# Patient Record
Sex: FEMALE | Race: WHITE | NOT HISPANIC OR LATINO | Employment: FULL TIME
[De-identification: names, ages, dates, MRNs, and addresses within clinical notes are randomized per-mention and may not be internally consistent; named-entity substitution may affect disease eponyms.]

---

## 2017-01-26 ENCOUNTER — RECORDS - HEALTHEAST (OUTPATIENT)
Dept: ADMINISTRATIVE | Facility: OTHER | Age: 22
End: 2017-01-26

## 2017-04-20 ENCOUNTER — RECORDS - HEALTHEAST (OUTPATIENT)
Dept: ADMINISTRATIVE | Facility: OTHER | Age: 22
End: 2017-04-20

## 2017-06-30 ENCOUNTER — RECORDS - HEALTHEAST (OUTPATIENT)
Dept: ADMINISTRATIVE | Facility: OTHER | Age: 22
End: 2017-06-30

## 2017-07-06 ENCOUNTER — RECORDS - HEALTHEAST (OUTPATIENT)
Dept: ADMINISTRATIVE | Facility: OTHER | Age: 22
End: 2017-07-06

## 2017-07-14 ENCOUNTER — RECORDS - HEALTHEAST (OUTPATIENT)
Dept: ADMINISTRATIVE | Facility: OTHER | Age: 22
End: 2017-07-14

## 2017-07-21 ENCOUNTER — RECORDS - HEALTHEAST (OUTPATIENT)
Dept: ADMINISTRATIVE | Facility: OTHER | Age: 22
End: 2017-07-21

## 2017-07-22 ENCOUNTER — ANESTHESIA - HEALTHEAST (OUTPATIENT)
Dept: OBGYN | Facility: HOSPITAL | Age: 22
End: 2017-07-22

## 2017-07-26 ENCOUNTER — COMMUNICATION - HEALTHEAST (OUTPATIENT)
Dept: OBGYN | Facility: HOSPITAL | Age: 22
End: 2017-07-26

## 2018-07-01 ENCOUNTER — OFFICE VISIT (OUTPATIENT)
Dept: URGENT CARE | Facility: URGENT CARE | Age: 23
End: 2018-07-01
Payer: COMMERCIAL

## 2018-07-01 VITALS
WEIGHT: 128.4 LBS | RESPIRATION RATE: 16 BRPM | OXYGEN SATURATION: 100 % | DIASTOLIC BLOOD PRESSURE: 84 MMHG | HEART RATE: 73 BPM | TEMPERATURE: 96.8 F | SYSTOLIC BLOOD PRESSURE: 116 MMHG | BODY MASS INDEX: 22.04 KG/M2

## 2018-07-01 DIAGNOSIS — N30.01 ACUTE CYSTITIS WITH HEMATURIA: Primary | ICD-10-CM

## 2018-07-01 DIAGNOSIS — R30.0 DYSURIA: ICD-10-CM

## 2018-07-01 DIAGNOSIS — R82.90 NONSPECIFIC FINDING ON EXAMINATION OF URINE: ICD-10-CM

## 2018-07-01 LAB
ALBUMIN UR-MCNC: NEGATIVE MG/DL
APPEARANCE UR: ABNORMAL
BACTERIA #/AREA URNS HPF: ABNORMAL /HPF
BILIRUB UR QL STRIP: NEGATIVE
COLOR UR AUTO: YELLOW
GLUCOSE UR STRIP-MCNC: NEGATIVE MG/DL
HGB UR QL STRIP: ABNORMAL
KETONES UR STRIP-MCNC: NEGATIVE MG/DL
LEUKOCYTE ESTERASE UR QL STRIP: ABNORMAL
NITRATE UR QL: NEGATIVE
NON-SQ EPI CELLS #/AREA URNS LPF: ABNORMAL /LPF
PH UR STRIP: 7 PH (ref 5–7)
RBC #/AREA URNS AUTO: ABNORMAL /HPF
SOURCE: ABNORMAL
SP GR UR STRIP: <=1.005 (ref 1–1.03)
UROBILINOGEN UR STRIP-ACNC: 0.2 EU/DL (ref 0.2–1)
WBC #/AREA URNS AUTO: ABNORMAL /HPF

## 2018-07-01 PROCEDURE — 87086 URINE CULTURE/COLONY COUNT: CPT | Performed by: FAMILY MEDICINE

## 2018-07-01 PROCEDURE — 99213 OFFICE O/P EST LOW 20 MIN: CPT | Performed by: FAMILY MEDICINE

## 2018-07-01 PROCEDURE — 81001 URINALYSIS AUTO W/SCOPE: CPT | Performed by: FAMILY MEDICINE

## 2018-07-01 PROCEDURE — 87186 SC STD MICRODIL/AGAR DIL: CPT | Performed by: FAMILY MEDICINE

## 2018-07-01 PROCEDURE — 87088 URINE BACTERIA CULTURE: CPT | Performed by: FAMILY MEDICINE

## 2018-07-01 RX ORDER — SULFAMETHOXAZOLE/TRIMETHOPRIM 800-160 MG
1 TABLET ORAL 2 TIMES DAILY
Qty: 6 TABLET | Refills: 0 | Status: SHIPPED | OUTPATIENT
Start: 2018-07-01 | End: 2018-07-04

## 2018-07-01 NOTE — PATIENT INSTRUCTIONS
"   * BLADDER INFECTION,Female (Adult)    A bladder infection (\"cystitis\" or \"UTI\") usually causes a constant urge to urinate and a burning when passing urine. Urine may be cloudy, smelly or dark. There may be pain in the lower abdomen. A bladder infection occurs when bacteria from the vaginal area enter the bladder opening (urethra). This can occur from sexual intercourse, wearing tight clothing, dehydration and other factors.  HOME CARE:  1. Drink lots of fluids (at least 6-8 glasses a day, unless you must restrict fluids for other medical reasons). This will force the medicine into your urinary system and flush the bacteria out of your body. Cranberry juice has been shown to help clear out the bacteria.  2. Avoid sexual intercourse until your symptoms are gone.  3. A bladder infection is treated with antibiotics. You may also be given Pyridium (generic = phenazopyridine) to reduce the burning sensation. This medicine will cause your urine to become a bright orange color. The orange urine may stain clothing. You may wear a pad or panty-liner to protect clothing.  PREVENTING FUTURE INFECTIONS:  1. Always wipe from front to back after a bowel movement.  2. Keep the genital area clean and dry.  3. Drink plenty of fluids each day to avoid dehydration.  4. Urinate right after intercourse to flush out the bladder.  5. Wear cotton underwear and cotton-lined panty hose; avoid tight-fitting pants.  6. If you are on birth control pills and are having frequent bladder infections, discuss with your doctor.  FOLLOW UP: Return to this facility or see your doctor if ALL symptoms are not gone after three days of treatment.  GET PROMPT MEDICAL ATTENTION if any of the following occur:    Fever over 101 F (38.3 C)    No improvement by the third day of treatment    Increasing back or abdominal pain    Repeated vomiting; unable to keep medicine down    Weakness, dizziness or fainting    Vaginal discharge    Pain, redness or swelling in " the labia (outer vaginal area)    5675-4292 [employer]. [address] [place] [address], [place] [address]. All rights reserved. This information is not intended as a substitute for professional medical care. Always follow your healthcare professional's instructions.  This information has been modified by your health care provider with permission from the publisher.

## 2018-07-01 NOTE — MR AVS SNAPSHOT
"              After Visit Summary   7/1/2018    Cindy Castillo    MRN: [id number]           Patient Information     Date Of Birth          1995        Visit Information        Provider Department      7/1/2018 9:05 AM Rosendo Bautista MD [facility]        Today's Diagnoses     Acute cystitis with hematuria    -  1    Dysuria        Nonspecific finding on examination of urine          Care Instructions       * BLADDER INFECTION,Female (Adult)    A bladder infection (\"cystitis\" or \"UTI\") usually causes a constant urge to urinate and a burning when passing urine. Urine may be cloudy, smelly or dark. There may be pain in the lower abdomen. A bladder infection occurs when bacteria from the vaginal area enter the bladder opening (urethra). This can occur from sexual intercourse, wearing tight clothing, dehydration and other factors.  HOME CARE:  1. Drink lots of fluids (at least 6-8 glasses a day, unless you must restrict fluids for other medical reasons). This will force the medicine into your urinary system and flush the bacteria out of your body. Cranberry juice has been shown to help clear out the bacteria.  2. Avoid sexual intercourse until your symptoms are gone.  3. A bladder infection is treated with antibiotics. You may also be given Pyridium (generic = phenazopyridine) to reduce the burning sensation. This medicine will cause your urine to become a bright orange color. The orange urine may stain clothing. You may wear a pad or panty-liner to protect clothing.  PREVENTING FUTURE INFECTIONS:  1. Always wipe from front to back after a bowel movement.  2. Keep the genital area clean and dry.  3. Drink plenty of fluids each day to avoid dehydration.  4. Urinate right after intercourse to flush out the bladder.  5. Wear cotton underwear and cotton-lined panty hose; avoid tight-fitting pants.  6. If you are on birth control pills and are having frequent bladder infections, discuss " with your doctor.  FOLLOW UP: Return to this facility or see your doctor if ALL symptoms are not gone after three days of treatment.  GET PROMPT MEDICAL ATTENTION if any of the following occur:    Fever over 101 F (38.3 C)    No improvement by the third day of treatment    Increasing back or abdominal pain    Repeated vomiting; unable to keep medicine down    Weakness, dizziness or fainting    Vaginal discharge    Pain, redness or swelling in the labia (outer vaginal area)    5542-9605 [employer]. [address] [place] [address]. All rights reserved. This information is not intended as a substitute for professional medical care. Always follow your healthcare professional's instructions.  This information has been modified by your health care provider with permission from the publisher.            Follow-ups after your visit        Who to contact     If you have questions or need follow up information about today's clinic visit or your schedule please contact [facility] directly at [phone].  Normal or non-critical lab and imaging results will be communicated to you by MyChart, letter or phone within 4 business days after the clinic has received the results. If you do not hear from us within 7 days, please contact the clinic through [employer] or phone. If you have a critical or abnormal lab result, we will notify you by phone as soon as possible.  Submit refill requests through [employer] or call your pharmacy and they will forward the refill request to us. Please allow 3 business days for your refill to be completed.          Additional Information About Your Visit        Care EveryWhere ID     This is your Care EveryWhere ID. This could be used by other organizations to access your [facility] medical records  [id number]        Your Vitals Were     Pulse Temperature Respirations Last Period Pulse Oximetry BMI (Body Mass Index)    73 96.8  F (36  C) (Tympanic)  16 06/15/2018 (Approximate) 100% 22.04 kg/m2       Blood Pressure from Last 3 Encounters:   07/01/18 116/84   07/02/15 145/86    Weight from Last 3 Encounters:   07/01/18 128 lb 6.4 oz (58.2 kg)   07/02/15 136 lb (61.7 kg)              We Performed the Following     *UA reflex to Microscopic and Culture ([place] and [facility] (except [place] and [place])     Urine Culture Aerobic Bacterial     Urine Microscopic          Today's Medication Changes          These changes are accurate as of 7/1/18  9:24 AM.  If you have any questions, ask your nurse or doctor.               Start taking these medicines.        Dose/Directions    sulfamethoxazole-trimethoprim 800-160 MG per tablet   Commonly known as:  BACTRIM DS/SEPTRA DS   Used for:  Acute cystitis with hematuria   Started by:  Rosendo Bautista MD        Dose:  1 tablet   Take 1 tablet by mouth 2 times daily for 3 days   Quantity:  6 tablet   Refills:  0            Where to get your medicines      These medications were sent to [id number] - [facility] [address] [facility]  [address] [facility] [address]    Hours:  Closed 10-16-08 business to [facility] Phone:  [phone]     sulfamethoxazole-trimethoprim 800-160 MG per tablet                Primary Care Provider Office Phone # Fax #    [facility] [phone] [address] [facility] [address]        Equal Access to Services     KO ASHRAF AH: Hadii heladio carrasquillo hadasho Soamandeepali, waaxda luqadaha, qaybta kaalmada adeegyada, yandy ngo. So [facility] [phone].    ATENCIÓN: Si habla español, tiene a verdin disposición servicios gratuitos de asistencia lingüística. Llame al [phone].    We comply with applicable federal civil rights laws and [place] laws. We do not discriminate on the basis of race, color, national origin, age, disability, sex, sexual orientation, or gender identity.            Thank you!     Thank you  for choosing [facility]  for your care. Our goal is always to provide you with excellent care. Hearing back from our patients is one way we can continue to improve our services. Please take a few minutes to complete the written survey that you may receive in the mail after your visit with us. Thank you!             Your Updated Medication List - Protect others around you: Learn how to safely use, store and throw away your medicines at www.disposemymeds.org.          This list is accurate as of 7/1/18  9:24 AM.  Always use your most recent med list.                   Brand Name Dispense Instructions for use Diagnosis    sulfamethoxazole-trimethoprim 800-160 MG per tablet    BACTRIM DS/SEPTRA DS    6 tablet    Take 1 tablet by mouth 2 times daily for 3 days    Acute cystitis with hematuria

## 2018-07-01 NOTE — PROGRESS NOTES
SUBJECTIVE:   Cindy Castillo is a 23 year old female who presents to clinic today for the following health issues:    Chief Complaint   Patient presents with     UTI       Duration: 2 days     Description (location/character/radiation): burning, urgency, frequency, cloudy urine, tinge blood     Intensity:  moderate    Accompanying signs and symptoms: no fever, chills, flank pain    History (similar episodes/previous evaluation): None    Precipitating or alleviating factors: None    Therapies tried and outcome: hydration          Problem list and histories reviewed & adjusted, as indicated.  Additional history: as documented    There is no problem list on file for this patient.    History reviewed. No pertinent surgical history.    Social History   Substance Use Topics     Smoking status: Never Smoker     Smokeless tobacco: Never Used     Alcohol use No     Family History   Problem Relation Age of Onset     Hypertension Father      Hypertension Brother          No current outpatient prescriptions on file.     Allergies   Allergen Reactions     Penicillins      No lab results found.   BP Readings from Last 3 Encounters:   07/01/18 116/84   07/02/15 145/86    Wt Readings from Last 3 Encounters:   07/01/18 128 lb 6.4 oz (58.2 kg)   07/02/15 136 lb (61.7 kg)                  Labs reviewed in EPIC    Reviewed and updated as needed this visit by clinical staff  Tobacco  Allergies  Meds  Med Hx  Surg Hx  Fam Hx  Soc Hx      Reviewed and updated as needed this visit by Provider         ROS:  Constitutional, HEENT, cardiovascular, pulmonary, gi and gu systems are negative, except as otherwise noted.    OBJECTIVE:     /84  Pulse 73  Temp 96.8  F (36  C) (Tympanic)  Resp 16  Wt 128 lb 6.4 oz (58.2 kg)  LMP 06/15/2018 (Approximate)  SpO2 100%  BMI 22.04 kg/m2  Body mass index is 22.04 kg/(m^2).  GENERAL: healthy, alert and no distress  NECK: no adenopathy, no asymmetry, masses, or scars and thyroid normal to  "palpation  RESP: lungs clear to auscultation - no rales, rhonchi or wheezes  CV: regular rate and rhythm, normal S1 S2, no S3 or S4, no murmur, click or rub, no peripheral edema and peripheral pulses strong  ABDOMEN: soft, nontender, no hepatosplenomegaly, no masses and bowel sounds normal  MS: no gross musculoskeletal defects noted, no edema    Diagnostic Test Results:  Results for orders placed or performed in visit on 07/01/18 (from the past 24 hour(s))   *UA reflex to Microscopic and Culture ([place] and [facility] (except [place] and [place])   Result Value Ref Range    Color Urine Yellow     Appearance Urine Slightly Cloudy     Glucose Urine Negative NEG^Negative mg/dL    Bilirubin Urine Negative NEG^Negative    Ketones Urine Negative NEG^Negative mg/dL    Specific Gravity Urine <=1.005 1.003 - 1.035    Blood Urine Large (A) NEG^Negative    pH Urine 7.0 5.0 - 7.0 pH    Protein Albumin Urine Negative NEG^Negative mg/dL    Urobilinogen Urine 0.2 0.2 - 1.0 EU/dL    Nitrite Urine Negative NEG^Negative    Leukocyte Esterase Urine Large (A) NEG^Negative    Source Unspecified Urine    Urine Microscopic   Result Value Ref Range    WBC Urine  (A) OTO5^0 - 5 /HPF    RBC Urine 2-5 (A) OTO2^O - 2 /HPF    Squamous Epithelial /LPF Urine Few FEW^Few /LPF    Bacteria Urine Few (A) NEG^Negative /HPF       ASSESSMENT/PLAN:     (N30.01) Acute cystitis with hematuria  (primary encounter diagnosis)  Comment: UA and symptoms consistent with UA, urine culture sent   Plan: sulfamethoxazole-trimethoprim (BACTRIM         DS/SEPTRA DS) 800-160 MG per tablet            Patient Instructions      * BLADDER INFECTION,Female (Adult)    A bladder infection (\"cystitis\" or \"UTI\") usually causes a constant urge to urinate and a burning when passing urine. Urine may be cloudy, smelly or dark. There may be pain in the lower abdomen. A bladder infection occurs when bacteria from the vaginal area enter the bladder opening (urethra). " This can occur from sexual intercourse, wearing tight clothing, dehydration and other factors.  HOME CARE:  1. Drink lots of fluids (at least 6-8 glasses a day, unless you must restrict fluids for other medical reasons). This will force the medicine into your urinary system and flush the bacteria out of your body. Cranberry juice has been shown to help clear out the bacteria.  2. Avoid sexual intercourse until your symptoms are gone.  3. A bladder infection is treated with antibiotics. You may also be given Pyridium (generic = phenazopyridine) to reduce the burning sensation. This medicine will cause your urine to become a bright orange color. The orange urine may stain clothing. You may wear a pad or panty-liner to protect clothing.  PREVENTING FUTURE INFECTIONS:  1. Always wipe from front to back after a bowel movement.  2. Keep the genital area clean and dry.  3. Drink plenty of fluids each day to avoid dehydration.  4. Urinate right after intercourse to flush out the bladder.  5. Wear cotton underwear and cotton-lined panty hose; avoid tight-fitting pants.  6. If you are on birth control pills and are having frequent bladder infections, discuss with your doctor.  FOLLOW UP: Return to this facility or see your doctor if ALL symptoms are not gone after three days of treatment.  GET PROMPT MEDICAL ATTENTION if any of the following occur:    Fever over 101 F (38.3 C)    No improvement by the third day of treatment    Increasing back or abdominal pain    Repeated vomiting; unable to keep medicine down    Weakness, dizziness or fainting    Vaginal discharge    Pain, redness or swelling in the labia (outer vaginal area)    2118-1112 [employer]. [address] [place] [address], [place] [address]. All rights reserved. This information is not intended as a substitute for professional medical care. Always follow your healthcare professional's instructions.  This information has been modified by your health care  provider with permission from the publisher.        Rosendo Bautista MD  [facility]

## 2018-07-04 LAB
BACTERIA SPEC CULT: ABNORMAL
BACTERIA SPEC CULT: ABNORMAL
Lab: ABNORMAL
SPECIMEN SOURCE: ABNORMAL

## 2021-01-07 ENCOUNTER — RECORDS - HEALTHEAST (OUTPATIENT)
Dept: ADMINISTRATIVE | Facility: OTHER | Age: 26
End: 2021-01-07

## 2021-06-09 ENCOUNTER — OFFICE VISIT (OUTPATIENT)
Dept: SURGERY | Facility: CLINIC | Age: 26
End: 2021-06-09
Payer: COMMERCIAL

## 2021-06-09 VITALS
HEIGHT: 64 IN | HEART RATE: 68 BPM | TEMPERATURE: 98.2 F | DIASTOLIC BLOOD PRESSURE: 77 MMHG | SYSTOLIC BLOOD PRESSURE: 109 MMHG | WEIGHT: 140 LBS | BODY MASS INDEX: 23.9 KG/M2

## 2021-06-09 DIAGNOSIS — K80.20 SYMPTOMATIC CHOLELITHIASIS: Primary | ICD-10-CM

## 2021-06-09 PROCEDURE — 99204 OFFICE O/P NEW MOD 45 MIN: CPT | Performed by: SURGERY

## 2021-06-09 ASSESSMENT — MIFFLIN-ST. JEOR: SCORE: 1350.04

## 2021-06-09 NOTE — NURSING NOTE
"Initial /77 (BP Location: Right arm, Patient Position: Sitting, Cuff Size: Adult Large)   Pulse 68   Temp 98.2  F (36.8  C) (Tympanic)   Ht 1.626 m (5' 4\")   Wt 63.5 kg (140 lb)   BMI 24.03 kg/m   Estimated body mass index is 24.03 kg/m  as calculated from the following:    Height as of this encounter: 1.626 m (5' 4\").    Weight as of this encounter: 63.5 kg (140 lb). .    Margarita Castillo MA    "

## 2021-06-09 NOTE — LETTER
6/9/2021         RE: Cindy Tejeda  [address] [facility] [address]        Dear Colleague,    Thank you for referring your patient, Cindy Tejeda, to the [facility]. Please see a copy of my visit note below.    18-year-old female whom 3 weeks ago had an attack of abdominal pain.  Pain was in the right upper quadrant lasted up to 40 minutes before resolving.  She denied nausea and vomiting.  She does not remember what she ate.  She had an ultrasound done in outside facility which showed stones and sludge per her report.  We do not have a copy of this.  She denies fevers chills or any history of scleral icterus.  Her mother and grandmother both had their gallbladders out and there late 30s.  Pain she reported as being achy and crampy without radiation.  Pain was about 2-3 out of 10.  Patient wants to avoid surgery if possible.    There is no problem list on file for this patient.      History reviewed. No pertinent past medical history.    History reviewed. No pertinent surgical history.    History reviewed. No pertinent family history.    Social History     Tobacco Use     Smoking status: Never Smoker     Smokeless tobacco: Never Used   Substance Use Topics     Alcohol use: Not Currently        History   Drug Use Unknown       No current outpatient medications on file.       Allergies   Allergen Reactions     Penicillins Unknown and Other (See Comments)     Father is deathly allergic  Father has significant allergic reaction, pt has not ever been tested or taken any penicillin drugs  Family allergic.         ROS  Constitutional - Denies fevers, weight loss, malaise, lethargy  Neuro - Denies tremors or seizures  Pulmon - Denies SOB, dyspnea, hemoptysis, chronic cough or use of an inhaler  CV - Denies CP, SOB, lower extremity edema, difficulty w/ stairs, has never used NTG  GI - Denies hematemesis, BRBPR, melena, chronic diarrhea or epigastric pain   - Denies hematuria, difficulty  "voiding, h/o STDs  Hematology - Denies blood clotting disorders, chronic anemias  Dermatology - No melanomas or skin cancers  Rheumatology - No h/o RA  Pysch - Denies depression, bipolar d/o or schizophrenia    Exam:/77 (BP Location: Right arm, Patient Position: Sitting, Cuff Size: Adult Large)   Pulse 68   Temp 98.2  F (36.8  C) (Tympanic)   Ht 1.626 m (5' 4\")   Wt 63.5 kg (140 lb)   BMI 24.03 kg/m      General - Alert and Oriented X4, NAD, well nourished  HEENT - Normocephalic, atraumatic, PERRLA, Nose midline  Neck - supple, no LAD, Thyroid normal,   Lungs - Clear to auscultation bilaterally with good inspiratory effort, no tactile fremitus  CV - Heart RRR, no lift's, thrills, murmurs, rubs, or gallops. Carotid, radial, and femoral pulses 2+ bilaterally  Abdomen - Soft, non-tender, +BS, no hepatosplenomegaly, no palpable masses  Neuro - Full ROM, Strength 5/5 and major muscle groups, sensation intact  Extremities - No cyanosis, clubbing or edema    Assessment and plan: 26-year-old female with symptomatic cholelithiasis.  Given that patient had 1 attack several weeks ago and has none since, I do not recommend gallbladder removal at this time.  When she begins to have multiple attacks and this begins to interfere with activities of daily living, she is welcome to call back and we will go ahead and schedule her for a laparoscopic cholecystectomy, updating her H&P on the day of surgery.  Discussed risks with patient including avoiding fatty foods.  Patient understood and will call when she is ready to schedule. PATIENT IS CLEARED FOR SURGERY.     Leandro Hagan MD       Again, thank you for allowing me to participate in the care of your patient.        Sincerely,        Leandro Hagan MD    "

## 2021-06-09 NOTE — PROGRESS NOTES
18-year-old female whom 3 weeks ago had an attack of abdominal pain.  Pain was in the right upper quadrant lasted up to 40 minutes before resolving.  She denied nausea and vomiting.  She does not remember what she ate.  She had an ultrasound done in outside facility which showed stones and sludge per her report.  We do not have a copy of this.  She denies fevers chills or any history of scleral icterus.  Her mother and grandmother both had their gallbladders out and there late 30s.  Pain she reported as being achy and crampy without radiation.  Pain was about 2-3 out of 10.  Patient wants to avoid surgery if possible.    There is no problem list on file for this patient.      History reviewed. No pertinent past medical history.    History reviewed. No pertinent surgical history.    History reviewed. No pertinent family history.    Social History     Tobacco Use     Smoking status: Never Smoker     Smokeless tobacco: Never Used   Substance Use Topics     Alcohol use: Not Currently        History   Drug Use Unknown       No current outpatient medications on file.       Allergies   Allergen Reactions     Penicillins Unknown and Other (See Comments)     Father is deathly allergic  Father has significant allergic reaction, pt has not ever been tested or taken any penicillin drugs  Family allergic.         ROS  Constitutional - Denies fevers, weight loss, malaise, lethargy  Neuro - Denies tremors or seizures  Pulmon - Denies SOB, dyspnea, hemoptysis, chronic cough or use of an inhaler  CV - Denies CP, SOB, lower extremity edema, difficulty w/ stairs, has never used NTG  GI - Denies hematemesis, BRBPR, melena, chronic diarrhea or epigastric pain   - Denies hematuria, difficulty voiding, h/o STDs  Hematology - Denies blood clotting disorders, chronic anemias  Dermatology - No melanomas or skin cancers  Rheumatology - No h/o RA  Pysch - Denies depression, bipolar d/o or schizophrenia    Exam:/77 (BP Location: Right  "arm, Patient Position: Sitting, Cuff Size: Adult Large)   Pulse 68   Temp 98.2  F (36.8  C) (Tympanic)   Ht 1.626 m (5' 4\")   Wt 63.5 kg (140 lb)   BMI 24.03 kg/m      General - Alert and Oriented X4, NAD, well nourished  HEENT - Normocephalic, atraumatic, PERRLA, Nose midline  Neck - supple, no LAD, Thyroid normal,   Lungs - Clear to auscultation bilaterally with good inspiratory effort, no tactile fremitus  CV - Heart RRR, no lift's, thrills, murmurs, rubs, or gallops. Carotid, radial, and femoral pulses 2+ bilaterally  Abdomen - Soft, non-tender, +BS, no hepatosplenomegaly, no palpable masses  Neuro - Full ROM, Strength 5/5 and major muscle groups, sensation intact  Extremities - No cyanosis, clubbing or edema    Assessment and plan: 26-year-old female with symptomatic cholelithiasis.  Given that patient had 1 attack several weeks ago and has none since, I do not recommend gallbladder removal at this time.  When she begins to have multiple attacks and this begins to interfere with activities of daily living, she is welcome to call back and we will go ahead and schedule her for a laparoscopic cholecystectomy, updating her H&P on the day of surgery.  Discussed risks with patient including avoiding fatty foods.  Patient understood and will call when she is ready to schedule. PATIENT IS CLEARED FOR SURGERY.     Leandro Hagan MD   "

## 2021-06-12 NOTE — ANESTHESIA POSTPROCEDURE EVALUATION
Patient: Cindy Tejeda  * No procedures listed *  Anesthesia type: epidural    Patient location: PACU  Last vitals:   Vitals:    07/24/17 0015   BP: 125/75   Pulse: (!) 53   Resp: 16   Temp: 36.7  C (98.1  F)   SpO2: 96%     Post vital signs: stable  Level of consciousness: awake and responds to simple questions  Post-anesthesia pain: pain controlled  Post-anesthesia nausea and vomiting: no  Pulmonary: unassisted, return to baseline  Cardiovascular: stable and blood pressure at baseline  Hydration: adequate  Anesthetic events: no    QCDR Measures:  ASA# 11 - [place]inta-op Cardiac Arrest: ASA11B - Patient did NOT experience unanticipated cardiac arrest  ASA# 12 - Jacinta-op Mortality Rate: ASA12B - Patient did NOT die  ASA# 13 - PACU Re-Intubation Rate: ASA13B - Patient did NOT require a new airway mgmt  ASA# 10 - Composite Anes Safety: ASA10A - No serious adverse event  ASA# 38 - New Corneal Injury: ASA38A - No new exposure keratitis or corneal abrasion in PACU    Additional Notes: leg numbness resolved

## 2021-06-12 NOTE — ANESTHESIA PROCEDURE NOTES
Epidural Block    Patient location during procedure: OB  Time Called: 7/22/2017 1:23 PM    Staffing:  Performing  Anesthesiologist: BRITTANY COVINGTON  Preanesthetic Checklist  Completed: patient identified, risks, benefits, and alternatives discussed, timeout performed, consent obtained, at patient's request, airway assessed, oxygen available, suction available, emergency drugs available and hand hygiene performed  Procedure  Patient position: sitting  Prep: Betadine, site prepped and draped and etoh  Patient monitoring: continuous pulse oximetry, heart rate and blood pressure  Approach: midline  Location: L3-L4  Injection technique: KATHY saline  Number of Attempts:1  Needle  Needle type: Resilinc   Needle gauge: 18 G     Catheter in Space: 5  Assessment  Sensory level: T10  No complications      Additional Notes:  3cc 1% lidocaine skin, negative aspiration, negative test dose of 4cc 1.5% PF lidocaine with epi 1/200

## 2021-06-12 NOTE — ANESTHESIA PREPROCEDURE EVALUATION
Anesthesia Evaluation      Patient summary reviewed   No history of anesthetic complications     Airway   Mallampati: I  Neck ROM: full   Pulmonary - negative ROS and normal exam    breath sounds clear to auscultation                         Cardiovascular - normal exam  Exercise tolerance: > or = 4 METS  (-) hypertension, angina  Rhythm: regular  Rate: normal,         Neuro/Psych - negative ROS   (-) no neuromuscular disease, no CVA    Endo/Other    (+) pregnant  (-) no diabetes     GI/Hepatic/Renal            Dental - normal exam                        Anesthesia Plan  Planned anesthetic: epidural    ASA 2     Anesthetic plan and risks discussed with: patient  Anesthesia plan special considerations: rapid sequence induction,   Post-op plan: routine recovery

## 2021-06-16 PROBLEM — Z34.90 PREGNANT: Status: ACTIVE | Noted: 2017-07-20

## 2021-10-11 ENCOUNTER — TELEPHONE (OUTPATIENT)
Dept: SURGERY | Facility: CLINIC | Age: 26
End: 2021-10-11

## 2021-10-11 DIAGNOSIS — K80.20 GALLSTONES: Primary | ICD-10-CM

## 2021-10-11 NOTE — TELEPHONE ENCOUNTER
Reason for Call:  Other     Detailed comments: Pt states she has already had a consult with Dr. Hagan and would like to schedule gallbladder surgery.    Phone Number Patient can be reached at: Home number on file [phone] (home)    Best Time: any    Can we leave a detailed message on this number? YES    Call taken on 10/11/2021 at 12:52 PM by Rahat Zhu

## 2021-10-12 DIAGNOSIS — Z11.59 ENCOUNTER FOR SCREENING FOR OTHER VIRAL DISEASES: ICD-10-CM

## 2021-10-31 ENCOUNTER — LAB (OUTPATIENT)
Dept: LAB | Facility: CLINIC | Age: 26
End: 2021-10-31
Payer: COMMERCIAL

## 2021-10-31 DIAGNOSIS — Z11.59 ENCOUNTER FOR SCREENING FOR OTHER VIRAL DISEASES: ICD-10-CM

## 2021-10-31 PROCEDURE — U0003 INFECTIOUS AGENT DETECTION BY NUCLEIC ACID (DNA OR RNA); SEVERE ACUTE RESPIRATORY SYNDROME CORONAVIRUS 2 (SARS-COV-2) (CORONAVIRUS DISEASE [COVID-19]), AMPLIFIED PROBE TECHNIQUE, MAKING USE OF HIGH THROUGHPUT TECHNOLOGIES AS DESCRIBED BY CMS-2020-01-R: HCPCS

## 2021-10-31 PROCEDURE — U0005 INFEC AGEN DETEC AMPLI PROBE: HCPCS

## 2021-11-01 ENCOUNTER — ANESTHESIA EVENT (OUTPATIENT)
Dept: SURGERY | Facility: CLINIC | Age: 26
End: 2021-11-01
Payer: COMMERCIAL

## 2021-11-01 LAB — SARS-COV-2 RNA RESP QL NAA+PROBE: NEGATIVE

## 2021-11-01 NOTE — ANESTHESIA PREPROCEDURE EVALUATION
Anesthesia Pre-Procedure Evaluation    Patient: Cindy Tejeda   MRN: [id number] : 1995        Preoperative Diagnosis: Gallstones [K80.20]    Procedure : Procedure(s):  laparoscopic cholecystectomy          No past medical history on file.   No past surgical history on file.   Allergies   Allergen Reactions     Penicillins      Penicillins Unknown and Other (See Comments)     Father is deathly allergic  Father has significant allergic reaction, pt has not ever been tested or taken any penicillin drugs  Family allergic.         Social History     Tobacco Use     Smoking status: Never Smoker     Smokeless tobacco: Never Used   Substance Use Topics     Alcohol use: Not Currently      Wt Readings from Last 1 Encounters:   21 63.5 kg (140 lb)        Anesthesia Evaluation   Pt has had prior anesthetic. Type: Regional.        ROS/MED HX  ENT/Pulmonary:  - neg pulmonary ROS     Neurologic:  - neg neurologic ROS     Cardiovascular:  - neg cardiovascular ROS     METS/Exercise Tolerance:     Hematologic:  - neg hematologic  ROS     Musculoskeletal:  - neg musculoskeletal ROS     GI/Hepatic:     (+) cholecystitis/cholelithiasis,     Renal/Genitourinary:  - neg Renal ROS     Endo:  - neg endo ROS     Psychiatric/Substance Use:  - neg psychiatric ROS     Infectious Disease:  - neg infectious disease ROS     Malignancy:  - neg malignancy ROS     Other:  - neg other ROS          Physical Exam    Airway        Mallampati: I   TM distance: > 3 FB   Neck ROM: full   Mouth opening: > 3 cm    Respiratory Devices and Support         Dental  no notable dental history         Cardiovascular   cardiovascular exam normal          Pulmonary   pulmonary exam normal                OUTSIDE LABS:  CBC:   Lab Results   Component Value Date    WBC 8.8 2021    HGB 13.6 2021    HCT 39.7 2021     2021     BMP:   Lab Results   Component Value Date     2021    POTASSIUM 3.5 2021    CHLORIDE  107 01/23/2021    CO2 20 (L) 01/23/2021    BUN 8 01/23/2021    CR 0.73 01/23/2021     01/23/2021     COAGS: No results found for: PTT, INR, FIBR  POC: No results found for: BGM, HCG, HCGS  HEPATIC: No results found for: ALBUMIN, PROTTOTAL, ALT, AST, GGT, ALKPHOS, BILITOTAL, BILIDIRECT, MAGDALENO  OTHER:   Lab Results   Component Value Date    JUICE 8.6 01/23/2021       Anesthesia Plan    ASA Status:  1      Anesthesia Type: General.     - Airway: ETT   Induction: Propofol.   Maintenance: Balanced.        Consents    Anesthesia Plan(s) and associated risks, benefits, and realistic alternatives discussed. Questions answered and patient/representative(s) expressed understanding.     - Discussed with:  Patient         Postoperative Care    Pain management: IV analgesics, Oral pain medications, Multi-modal analgesia.   PONV prophylaxis: Ondansetron (or other 5HT-3), Dexamethasone or Solumedrol     Comments:                CYNDI Johnson CRNA

## 2021-11-02 ENCOUNTER — ANESTHESIA (OUTPATIENT)
Dept: SURGERY | Facility: CLINIC | Age: 26
End: 2021-11-02
Payer: COMMERCIAL

## 2021-11-02 ENCOUNTER — HOSPITAL ENCOUNTER (OUTPATIENT)
Facility: CLINIC | Age: 26
Discharge: HOME | End: 2021-11-02
Attending: SURGERY | Admitting: SURGERY
Payer: COMMERCIAL

## 2021-11-02 VITALS
HEIGHT: 64 IN | BODY MASS INDEX: 23.9 KG/M2 | WEIGHT: 140 LBS | SYSTOLIC BLOOD PRESSURE: 131 MMHG | RESPIRATION RATE: 12 BRPM | DIASTOLIC BLOOD PRESSURE: 96 MMHG | TEMPERATURE: 98.4 F | OXYGEN SATURATION: 99 % | HEART RATE: 110 BPM

## 2021-11-02 DIAGNOSIS — G89.18 POSTOPERATIVE PAIN: Primary | ICD-10-CM

## 2021-11-02 DIAGNOSIS — K80.20 GALLSTONES: ICD-10-CM

## 2021-11-02 PROCEDURE — 47562 LAPAROSCOPIC CHOLECYSTECTOMY: CPT | Performed by: SURGERY

## 2021-11-02 PROCEDURE — 250N000011 HC RX IP 250 OP 636: Performed by: NURSE ANESTHETIST, CERTIFIED REGISTERED

## 2021-11-02 PROCEDURE — 370N000017 HC ANESTHESIA TECHNICAL FEE, PER MIN: Performed by: SURGERY

## 2021-11-02 PROCEDURE — 710N000012 HC RECOVERY PHASE 2, PER MINUTE: Performed by: SURGERY

## 2021-11-02 PROCEDURE — 99214 OFFICE O/P EST MOD 30 MIN: CPT | Mod: 57 | Performed by: SURGERY

## 2021-11-02 PROCEDURE — 250N000013 HC RX MED GY IP 250 OP 250 PS 637: Performed by: SURGERY

## 2021-11-02 PROCEDURE — 360N000076 HC SURGERY LEVEL 3, PER MIN: Performed by: SURGERY

## 2021-11-02 PROCEDURE — 88304 TISSUE EXAM BY PATHOLOGIST: CPT | Mod: TC | Performed by: SURGERY

## 2021-11-02 PROCEDURE — 999N000141 HC STATISTIC PRE-PROCEDURE NURSING ASSESSMENT: Performed by: SURGERY

## 2021-11-02 PROCEDURE — 250N000009 HC RX 250: Performed by: NURSE ANESTHETIST, CERTIFIED REGISTERED

## 2021-11-02 PROCEDURE — 250N000025 HC SEVOFLURANE, PER MIN: Performed by: SURGERY

## 2021-11-02 PROCEDURE — 250N000011 HC RX IP 250 OP 636: Performed by: SURGERY

## 2021-11-02 PROCEDURE — 47562 LAPAROSCOPIC CHOLECYSTECTOMY: CPT | Mod: AS | Performed by: PHYSICIAN ASSISTANT

## 2021-11-02 PROCEDURE — 258N000003 HC RX IP 258 OP 636: Performed by: NURSE ANESTHETIST, CERTIFIED REGISTERED

## 2021-11-02 PROCEDURE — 258N000001 HC RX 258: Performed by: SURGERY

## 2021-11-02 PROCEDURE — 250N000013 HC RX MED GY IP 250 OP 250 PS 637: Performed by: NURSE ANESTHETIST, CERTIFIED REGISTERED

## 2021-11-02 PROCEDURE — 271N000001 HC OR GENERAL SUPPLY NON-STERILE: Performed by: SURGERY

## 2021-11-02 PROCEDURE — 710N000009 HC RECOVERY PHASE 1, LEVEL 1, PER MIN: Performed by: SURGERY

## 2021-11-02 PROCEDURE — 250N000009 HC RX 250: Performed by: SURGERY

## 2021-11-02 PROCEDURE — 272N000001 HC OR GENERAL SUPPLY STERILE: Performed by: SURGERY

## 2021-11-02 RX ORDER — ONDANSETRON 2 MG/ML
INJECTION INTRAMUSCULAR; INTRAVENOUS PRN
Status: DISCONTINUED | OUTPATIENT
Start: 2021-11-02 | End: 2021-11-02

## 2021-11-02 RX ORDER — MEPERIDINE HYDROCHLORIDE 25 MG/ML
12.5 INJECTION INTRAMUSCULAR; INTRAVENOUS; SUBCUTANEOUS
Status: DISCONTINUED | OUTPATIENT
Start: 2021-11-02 | End: 2021-11-02 | Stop reason: HOSPADM

## 2021-11-02 RX ORDER — LIDOCAINE 40 MG/G
CREAM TOPICAL
Status: DISCONTINUED | OUTPATIENT
Start: 2021-11-02 | End: 2021-11-02 | Stop reason: HOSPADM

## 2021-11-02 RX ORDER — ONDANSETRON 4 MG/1
4 TABLET, ORALLY DISINTEGRATING ORAL EVERY 30 MIN PRN
Status: DISCONTINUED | OUTPATIENT
Start: 2021-11-02 | End: 2021-11-02 | Stop reason: HOSPADM

## 2021-11-02 RX ORDER — NALOXONE HYDROCHLORIDE 0.4 MG/ML
0.4 INJECTION, SOLUTION INTRAMUSCULAR; INTRAVENOUS; SUBCUTANEOUS
Status: DISCONTINUED | OUTPATIENT
Start: 2021-11-02 | End: 2021-11-02 | Stop reason: HOSPADM

## 2021-11-02 RX ORDER — LIDOCAINE HYDROCHLORIDE 10 MG/ML
INJECTION, SOLUTION INFILTRATION; PERINEURAL PRN
Status: DISCONTINUED | OUTPATIENT
Start: 2021-11-02 | End: 2021-11-02

## 2021-11-02 RX ORDER — HYDROCODONE BITARTRATE AND ACETAMINOPHEN 5; 325 MG/1; MG/1
1-2 TABLET ORAL EVERY 4 HOURS PRN
Status: DISCONTINUED | OUTPATIENT
Start: 2021-11-02 | End: 2021-11-02 | Stop reason: HOSPADM

## 2021-11-02 RX ORDER — PROPOFOL 10 MG/ML
INJECTION, EMULSION INTRAVENOUS PRN
Status: DISCONTINUED | OUTPATIENT
Start: 2021-11-02 | End: 2021-11-02

## 2021-11-02 RX ORDER — FENTANYL CITRATE 50 UG/ML
50 INJECTION, SOLUTION INTRAMUSCULAR; INTRAVENOUS
Status: CANCELLED | OUTPATIENT
Start: 2021-11-02

## 2021-11-02 RX ORDER — BUPIVACAINE HYDROCHLORIDE AND EPINEPHRINE 2.5; 5 MG/ML; UG/ML
INJECTION, SOLUTION INFILTRATION; PERINEURAL PRN
Status: DISCONTINUED | OUTPATIENT
Start: 2021-11-02 | End: 2021-11-02 | Stop reason: HOSPADM

## 2021-11-02 RX ORDER — NALOXONE HYDROCHLORIDE 0.4 MG/ML
0.2 INJECTION, SOLUTION INTRAMUSCULAR; INTRAVENOUS; SUBCUTANEOUS
Status: DISCONTINUED | OUTPATIENT
Start: 2021-11-02 | End: 2021-11-02 | Stop reason: HOSPADM

## 2021-11-02 RX ORDER — GLYCOPYRROLATE 0.2 MG/ML
INJECTION, SOLUTION INTRAMUSCULAR; INTRAVENOUS PRN
Status: DISCONTINUED | OUTPATIENT
Start: 2021-11-02 | End: 2021-11-02

## 2021-11-02 RX ORDER — SODIUM CHLORIDE, SODIUM LACTATE, POTASSIUM CHLORIDE, CALCIUM CHLORIDE 600; 310; 30; 20 MG/100ML; MG/100ML; MG/100ML; MG/100ML
INJECTION, SOLUTION INTRAVENOUS CONTINUOUS
Status: DISCONTINUED | OUTPATIENT
Start: 2021-11-02 | End: 2021-11-02 | Stop reason: HOSPADM

## 2021-11-02 RX ORDER — DEXAMETHASONE SODIUM PHOSPHATE 4 MG/ML
INJECTION, SOLUTION INTRA-ARTICULAR; INTRALESIONAL; INTRAMUSCULAR; INTRAVENOUS; SOFT TISSUE PRN
Status: DISCONTINUED | OUTPATIENT
Start: 2021-11-02 | End: 2021-11-02

## 2021-11-02 RX ORDER — CEFAZOLIN SODIUM 2 G/100ML
2 INJECTION, SOLUTION INTRAVENOUS
Status: COMPLETED | OUTPATIENT
Start: 2021-11-02 | End: 2021-11-02

## 2021-11-02 RX ORDER — ACETAMINOPHEN 325 MG/1
975 TABLET ORAL ONCE
Status: COMPLETED | OUTPATIENT
Start: 2021-11-02 | End: 2021-11-02

## 2021-11-02 RX ORDER — FENTANYL CITRATE 50 UG/ML
50 INJECTION, SOLUTION INTRAMUSCULAR; INTRAVENOUS EVERY 5 MIN PRN
Status: DISCONTINUED | OUTPATIENT
Start: 2021-11-02 | End: 2021-11-02 | Stop reason: HOSPADM

## 2021-11-02 RX ORDER — HYDROCODONE BITARTRATE AND ACETAMINOPHEN 5; 325 MG/1; MG/1
1-2 TABLET ORAL EVERY 6 HOURS PRN
Qty: 20 TABLET | Refills: 0 | Status: SHIPPED | OUTPATIENT
Start: 2021-11-02 | End: 2021-11-12

## 2021-11-02 RX ORDER — CEFAZOLIN SODIUM 2 G/100ML
2 INJECTION, SOLUTION INTRAVENOUS SEE ADMIN INSTRUCTIONS
Status: DISCONTINUED | OUTPATIENT
Start: 2021-11-02 | End: 2021-11-02 | Stop reason: HOSPADM

## 2021-11-02 RX ORDER — KETOROLAC TROMETHAMINE 30 MG/ML
INJECTION, SOLUTION INTRAMUSCULAR; INTRAVENOUS PRN
Status: DISCONTINUED | OUTPATIENT
Start: 2021-11-02 | End: 2021-11-02

## 2021-11-02 RX ORDER — ONDANSETRON 2 MG/ML
4 INJECTION INTRAMUSCULAR; INTRAVENOUS EVERY 30 MIN PRN
Status: DISCONTINUED | OUTPATIENT
Start: 2021-11-02 | End: 2021-11-02 | Stop reason: HOSPADM

## 2021-11-02 RX ORDER — GABAPENTIN 300 MG/1
300 CAPSULE ORAL
Status: COMPLETED | OUTPATIENT
Start: 2021-11-02 | End: 2021-11-02

## 2021-11-02 RX ORDER — FENTANYL CITRATE 50 UG/ML
INJECTION, SOLUTION INTRAMUSCULAR; INTRAVENOUS PRN
Status: DISCONTINUED | OUTPATIENT
Start: 2021-11-02 | End: 2021-11-02

## 2021-11-02 RX ADMIN — LIDOCAINE HYDROCHLORIDE 0.1 ML: 10 INJECTION, SOLUTION EPIDURAL; INFILTRATION; INTRACAUDAL; PERINEURAL at 09:09

## 2021-11-02 RX ADMIN — CEFAZOLIN SODIUM 2 G: 2 INJECTION, SOLUTION INTRAVENOUS at 09:13

## 2021-11-02 RX ADMIN — SODIUM CHLORIDE, POTASSIUM CHLORIDE, SODIUM LACTATE AND CALCIUM CHLORIDE: 600; 310; 30; 20 INJECTION, SOLUTION INTRAVENOUS at 09:09

## 2021-11-02 RX ADMIN — GABAPENTIN 300 MG: 300 CAPSULE ORAL at 08:37

## 2021-11-02 RX ADMIN — FENTANYL CITRATE 100 MCG: 50 INJECTION, SOLUTION INTRAMUSCULAR; INTRAVENOUS at 09:38

## 2021-11-02 RX ADMIN — PROPOFOL 200 MG: 10 INJECTION, EMULSION INTRAVENOUS at 09:18

## 2021-11-02 RX ADMIN — FENTANYL CITRATE 50 MCG: 50 INJECTION, SOLUTION INTRAMUSCULAR; INTRAVENOUS at 10:28

## 2021-11-02 RX ADMIN — LIDOCAINE HYDROCHLORIDE 100 MG: 10 INJECTION, SOLUTION INFILTRATION; PERINEURAL at 09:18

## 2021-11-02 RX ADMIN — ROCURONIUM BROMIDE 40 MG: 50 INJECTION, SOLUTION INTRAVENOUS at 09:18

## 2021-11-02 RX ADMIN — ONDANSETRON 4 MG: 2 INJECTION INTRAMUSCULAR; INTRAVENOUS at 09:50

## 2021-11-02 RX ADMIN — DEXAMETHASONE SODIUM PHOSPHATE 8 MG: 4 INJECTION, SOLUTION INTRA-ARTICULAR; INTRALESIONAL; INTRAMUSCULAR; INTRAVENOUS; SOFT TISSUE at 09:18

## 2021-11-02 RX ADMIN — KETOROLAC TROMETHAMINE 15 MG: 30 INJECTION, SOLUTION INTRAMUSCULAR at 09:32

## 2021-11-02 RX ADMIN — FENTANYL CITRATE 100 MCG: 50 INJECTION, SOLUTION INTRAMUSCULAR; INTRAVENOUS at 09:24

## 2021-11-02 RX ADMIN — FENTANYL CITRATE 100 MCG: 50 INJECTION, SOLUTION INTRAMUSCULAR; INTRAVENOUS at 09:32

## 2021-11-02 RX ADMIN — GLYCOPYRROLATE 0.2 MG: 0.2 INJECTION, SOLUTION INTRAMUSCULAR; INTRAVENOUS at 09:18

## 2021-11-02 RX ADMIN — ACETAMINOPHEN 975 MG: 325 TABLET, FILM COATED ORAL at 08:37

## 2021-11-02 RX ADMIN — SUGAMMADEX 120 MG: 100 INJECTION, SOLUTION INTRAVENOUS at 09:50

## 2021-11-02 RX ADMIN — MEPERIDINE HYDROCHLORIDE 12.5 MG: 25 INJECTION INTRAMUSCULAR; INTRAVENOUS; SUBCUTANEOUS at 10:28

## 2021-11-02 RX ADMIN — HYDROCODONE BITARTRATE AND ACETAMINOPHEN 1 TABLET: 5; 325 TABLET ORAL at 11:00

## 2021-11-02 RX ADMIN — FENTANYL CITRATE 100 MCG: 50 INJECTION, SOLUTION INTRAMUSCULAR; INTRAVENOUS at 09:18

## 2021-11-02 RX ADMIN — FENTANYL CITRATE 50 MCG: 50 INJECTION, SOLUTION INTRAMUSCULAR; INTRAVENOUS at 10:40

## 2021-11-02 ASSESSMENT — MIFFLIN-ST. JEOR: SCORE: 1350.04

## 2021-11-02 NOTE — DISCHARGE INSTRUCTIONS
DISCHARGE INSTRUCTIONS     1. You may resume your regular diet when you feel you are ready    2. No heavy lifting (>30lbs)  for 1 month.    3. You will have some discomfort at the incision sites. This is expected. This should improve over the next 2-3 days. Ice and pain medication will help with this pain. Use prescribed pain medication as instructed.     4. Some bruising and mild swelling is normal after surgery. The area below and around the incision(s) may be hard and elevated. This is part of the normal healing process. This will resolve slowly over the next several months.     5. Your wounds are covered with glue. The glue is water tight and so you can shower or bathe immediately following surgery.     6. Use the following medications (in addition to your normal meds) as shown:      Tylenol (acetaminophen) 500 mg every 6 hours as needed for pain.    Do not take more than 1000 mg of Tylenol every 6 hours -OR- 4g in a day    Motrin (ibuprophen) 600 mg every 6 hours as needed for pain. Take with food.     8. Notify Clinic at [phone] if:     Your discomfort is not relieved by your pain medication     You have signs of infection such as temperature above 100.4 degrees orally,  chills, or increasing daily discomfort.     Incision site is becoming more red and/or there is purulent drainage.      9. Follow up with Dr Hagan in 1-2 weeks.                        Same Day Surgery Discharge Instructions  Special Precautions After Surgery - Adult    1. It is not unusual to feel lightheaded or faint, up to 24 hours after surgery or while taking pain medication.  If you have these symptoms; sit for a few minutes before standing and have someone assist you when getting up.  2. You should rest and relax for the next 24 hours and must have someone stay with you for at least 24 hours after your discharge.  3. DO NOT DRIVE any vehicle or operate mechanical equipment for 24 hours following the end of your surgery.  DO NOT  DRIVE while taking narcotic pain medications that have been prescribed by your physician.  If you had a limb operated on, you must be able to use it fully to drive.  4. DO NOT drink alcoholic beverages for 24 hours following surgery or while taking prescription pain medication.  5. Drink clear liquids (apple juice, ginger ale, broth, 7-Up, etc.).  Progress to your regular diet as you feel able.  6. Any questions call your physician and do not make important decisions for 24 hours.    __________________________________________________________________________________________________________________________________  IMPORTANT NUMBERS:    [facility] Main Number:  [phone], [phone]  Pharmacy:  [phone]  Same Day Surgery:  [phone], Monday - Friday until 8:30 p.m.  Urgent Care:  [phone]  Emergency Room:  [phone]

## 2021-11-02 NOTE — OP NOTE
Preop diagnosis: Symptomatic cholelithiasis    Postop diagnosis: Same    Procedure: Laparoscopic cholecystectomy    Surgeon: Bentley    Assistant surgeon: Ed Martinez PA-C (needed fracture teaching camera operation retraction hemostasis wound closure and suctioning)    Anesthesia: General endotracheal Salvador CRNA    Procedure: Patient abdomen was cleaned and draped in sterile manner.  1/4% Marcaine with epinephrine was used anesthetize all port sites.  Small subumbilical curvilinear incision made and subcutaneous tissues dissected to fascia.  Fascia opened sharply and 12 mm blunt trocar inserted.  Carbon dioxide insufflated to 15 mmHg and under direct vision, subxiphoid 11 mm trocar placed as were 2 right-sided 5 mm trochars.  Patient placed into reverse Trendelenburg left side down.  Gallbladder was easily located in the right upper quadrant.  It was gently grasped and elevated.  There is no inflammation.  Small amount of fat surrounding the neck of the gallbladder was dissected free until the cystic duct was isolated.  It was encircled, clipped twice proximally once distally and ligated.  In a similar fashion 2 branches of the cystic artery were located clipped and ligated.  The gallbladder was then removed from liver bed use electrocautery, placed into an Endo Catch bag and brought out through the subxiphoid port.  2 L of warm normal saline solution was used to irrigate out the abdominal cavity and this was sucked free until the effluent was clear.  Small amount of bleeding in the liver bed was controlled with electrocautery.  Final inspection of the bed revealed it to be intact with no evidence of hemorrhage or leakage and both cystic artery and duct stumps were intact with clips applied.  Finally all trochars were removed under direct vision and the air allowed to desufflate.  The fascial defect of the subumbilical port was closed using an 0 Vicryl suture in a figure-of-eight fashion and this was bolstered with a  second 0 Vicryl on top of that and injected with Marcaine.  All wounds were irrigated with normal saline and the skin closed using 4-0 Vicryl running subcuticular stitches and dressed with Dermabond.    Estimated blood loss: 5 mL    IV fluid: 700 mL

## 2021-11-02 NOTE — ANESTHESIA CARE TRANSFER NOTE
Patient: Cindy Tejeda    Procedure: Procedure(s):  Laparoscopic Cholecystectomy       Diagnosis: Gallstones [K80.20]  Diagnosis Additional Information: No value filed.    Anesthesia Type:   General     Note:    Oropharynx: oropharynx clear of all foreign objects  Level of Consciousness: awake  Oxygen Supplementation: face mask    Independent Airway: airway patency satisfactory and stable  Dentition: dentition unchanged  Vital Signs Stable: post-procedure vital signs reviewed and stable  Report to RN Given: handoff report given  Patient transferred to: PACU    Handoff Report: Identifed the Patient, Identified the Reponsible Provider, Reviewed the pertinent medical history, Discussed the surgical course, Reviewed Intra-OP anesthesia mangement and issues during anesthesia, Set expectations for post-procedure period and Allowed opportunity for questions and acknowledgement of understanding      Vitals:  Vitals Value Taken Time   BP     Temp     Pulse 121 11/02/21 1014   Resp 21 11/02/21 1014   SpO2 100 % 11/02/21 1014   Vitals shown include unvalidated device data.    Electronically Signed By: CYNDI Johnson CRNA  November 2, 2021  10:15 AM

## 2021-11-02 NOTE — H&P
26-year-old female whom 5 months ago had an attack of abdominal pain.  Pain was in the right upper quadrant lasted up to 40 minutes before resolving.  She denied nausea and vomiting.  She does not remember what she ate.  She had an ultrasound done in outside facility which showed stones and sludge per her report.  We do not have a copy of this.  She denies fevers chills or any history of scleral icterus.  Her mother and grandmother both had their gallbladders out and there late 30s.  Pain she reported as being achy and crampy without radiation.  Pain was about 2-3 out of 10.       There is no problem list on file for this patient.        Past Medical History   History reviewed. No pertinent past medical history.        Past Surgical History[]Expand by Default   History reviewed. No pertinent surgical history.        Family History   History reviewed. No pertinent family history.        Social History           Tobacco Use     Smoking status: Never Smoker     Smokeless tobacco: Never Used   Substance Use Topics     Alcohol use: Not Currently         History   Drug Use Unknown         Active Medications   No current outpatient medications on file.                  Allergies   Allergen Reactions     Penicillins Unknown and Other (See Comments)       Father is deathly allergic  Father has significant allergic reaction, pt has not ever been tested or taken any penicillin drugs  Family allergic.           ROS  Constitutional - Denies fevers, weight loss, malaise, lethargy  Neuro - Denies tremors or seizures  Pulmon - Denies SOB, dyspnea, hemoptysis, chronic cough or use of an inhaler  CV - Denies CP, SOB, lower extremity edema, difficulty w/ stairs, has never used NTG  GI - Denies hematemesis, BRBPR, melena, chronic diarrhea or epigastric pain   - Denies hematuria, difficulty voiding, h/o STDs  Hematology - Denies blood clotting disorders, chronic anemias  Dermatology - No melanomas or skin cancers  Rheumatology - No  "h/o RA  Pysch - Denies depression, bipolar d/o or schizophrenia     Exam:/72 (BP Location: Right arm)   Pulse 107   Temp 99.1  F (37.3  C) (Oral)   Resp 16   Ht 1.626 m (5' 4\")   Wt 63.5 kg (140 lb)   SpO2 97%   BMI 24.03 kg/m         General - Alert and Oriented X4, NAD, well nourished  HEENT - Normocephalic, atraumatic, PERRLA, Nose midline  Neck - supple, no LAD, Thyroid normal,   Lungs - Clear to auscultation bilaterally with good inspiratory effort, no tactile fremitus  CV - Heart RRR, no lift's, thrills, murmurs, rubs, or gallops. Carotid, radial, and femoral pulses 2+ bilaterally  Abdomen - Soft, non-tender, +BS, no hepatosplenomegaly, no palpable masses  Neuro - Full ROM, Strength 5/5 and major muscle groups, sensation intact  Extremities - No cyanosis, clubbing or edema     Assessment and plan: 26-year-old female with symptomatic cholelithiasis.    Patient is good candidate for laparoscopic cholecystectomy.  Risks benefits alternatives and complications were discussed with the patient including the possibility of infection bleeding or bile leak.  Patient understood and wished to proceed. PATIENT IS CLEARED FOR SURGERY.      Leandro Hagan MD     "

## 2021-11-02 NOTE — ANESTHESIA PROCEDURE NOTES
Airway       Patient location during procedure: OR       Procedure Start/Stop Times: 11/2/2021 9:20 AM  Staff -        CRNA: Ciara Salvador APRN CRNA       Performed By: CRNA  Consent for Airway        Urgency: elective  Indications and Patient Condition       Indications for airway management: kaden-procedural and airway protection       Induction type:intravenous       Mask difficulty assessment: 1 - vent by mask    Final Airway Details       Final airway type: endotracheal airway       Successful airway: ETT - single  Endotracheal Airway Details        ETT size (mm): 7.0       Cuffed: yes       Successful intubation technique: video laryngoscopy       VL Blade Size: Calderón 3       Grade View of Cords: 1       Adjucts: stylet       Position: Right       Measured from: gums/teeth       Secured at (cm): 21       Bite block used: None    Post intubation assessment        Placement verified by: capnometry, equal breath sounds and chest rise        Number of attempts at approach: 1       Number of other approaches attempted: 0       Secured with: silk tape       Ease of procedure: easy       Dentition: Intact and Unchanged

## 2021-11-02 NOTE — ANESTHESIA POSTPROCEDURE EVALUATION
Patient: Cindy Tejeda    Procedure: Procedure(s):  Laparoscopic Cholecystectomy       Diagnosis:Gallstones [K80.20]  Diagnosis Additional Information: No value filed.    Anesthesia Type:  General    Note:  Disposition: Outpatient   Postop Pain Control: Uneventful            Sign Out: Well controlled pain   PONV: No   Neuro/Psych: Uneventful            Sign Out: Acceptable/Baseline neuro status   Airway/Respiratory: Uneventful            Sign Out: Acceptable/Baseline resp. status   CV/Hemodynamics: Uneventful            Sign Out: Acceptable CV status; No obvious hypovolemia; No obvious fluid overload   Other NRE: NONE   DID A NON-ROUTINE EVENT OCCUR? No           Last vitals:  Vitals Value Taken Time   /81 11/02/21 1045   Temp 36.9  C (98.4  F) 11/02/21 1013   Pulse 82 11/02/21 1049   Resp 0 11/02/21 1035   SpO2 98 % 11/02/21 1049   Vitals shown include unvalidated device data.    Electronically Signed By: CYNDI Johnson CRNA  November 2, 2021  11:05 AM

## 2021-11-03 LAB
PATH REPORT.COMMENTS IMP SPEC: NORMAL
PATH REPORT.COMMENTS IMP SPEC: NORMAL
PATH REPORT.FINAL DX SPEC: NORMAL
PATH REPORT.GROSS SPEC: NORMAL
PATH REPORT.MICROSCOPIC SPEC OTHER STN: NORMAL
PATH REPORT.RELEVANT HX SPEC: NORMAL
PHOTO IMAGE: NORMAL

## 2021-11-03 PROCEDURE — 88304 TISSUE EXAM BY PATHOLOGIST: CPT | Mod: 26 | Performed by: PATHOLOGY

## 2021-11-12 ENCOUNTER — OFFICE VISIT (OUTPATIENT)
Dept: SURGERY | Facility: CLINIC | Age: 26
End: 2021-11-12
Payer: COMMERCIAL

## 2021-11-12 VITALS
HEIGHT: 64 IN | WEIGHT: 140 LBS | HEART RATE: 77 BPM | DIASTOLIC BLOOD PRESSURE: 78 MMHG | TEMPERATURE: 97.6 F | BODY MASS INDEX: 23.9 KG/M2 | SYSTOLIC BLOOD PRESSURE: 115 MMHG

## 2021-11-12 DIAGNOSIS — Z09 POSTOP CHECK: ICD-10-CM

## 2021-11-12 DIAGNOSIS — K12.0 APHTHAE, ORAL: Primary | ICD-10-CM

## 2021-11-12 PROCEDURE — 99024 POSTOP FOLLOW-UP VISIT: CPT | Performed by: SURGERY

## 2021-11-12 RX ORDER — TRIAMCINOLONE ACETONIDE 0.1 %
PASTE (GRAM) DENTAL 2 TIMES DAILY
Qty: 5 G | Refills: 1 | Status: SHIPPED | OUTPATIENT
Start: 2021-11-12

## 2021-11-12 ASSESSMENT — MIFFLIN-ST. JEOR: SCORE: 1350.04

## 2021-11-12 ASSESSMENT — PAIN SCALES - GENERAL: PAINLEVEL: NO PAIN (0)

## 2021-11-12 NOTE — NURSING NOTE
"Initial /78 (BP Location: Right arm, Patient Position: Sitting, Cuff Size: Adult Regular)   Pulse 77   Temp 97.6  F (36.4  C)   Ht 1.626 m (5' 4\")   Wt 63.5 kg (140 lb)   BMI 24.03 kg/m   Estimated body mass index is 24.03 kg/m  as calculated from the following:    Height as of this encounter: 1.626 m (5' 4\").    Weight as of this encounter: 63.5 kg (140 lb). .    Jasmin Graham LPN on 11/12/2021 at 8:26 AM    "

## 2021-11-12 NOTE — PROGRESS NOTES
"No complaints.  Pain controlled with oral pain meds.    /78 (BP Location: Right arm, Patient Position: Sitting, Cuff Size: Adult Regular)   Pulse 77   Temp 97.6  F (36.4  C)   Ht 1.626 m (5' 4\")   Wt 63.5 kg (140 lb)   BMI 24.03 kg/m      Exam  [id number]  CTAB  RRR  S&NTND+BS, wounds - cdi s erythema  No CCE    A/P s/p lap luis healing well.  No heavy lifting for 3 weeks.  RTC prn.    Patient mentioned getting frequent canker sores.  Wrote prescription for triamcinolone dental paste.  This should help.  Follow-up as necessary.    Leandro Hagan MD   "

## 2021-11-12 NOTE — LETTER
"    11/12/2021         RE: Cindy Tejeda  [address] [place] [facility] [address]        Dear Colleague,    Thank you for referring your patient, Cindy Tejeda, to the [facility]. Please see a copy of my visit note below.    No complaints.  Pain controlled with oral pain meds.    /78 (BP Location: Right arm, Patient Position: Sitting, Cuff Size: Adult Regular)   Pulse 77   Temp 97.6  F (36.4  C)   Ht 1.626 m (5' 4\")   Wt 63.5 kg (140 lb)   BMI 24.03 kg/m      Exam  [id number]  CTAB  RRR  S&NTND+BS, wounds - cdi s erythema  No CCE    A/P s/p lap luis healing well.  No heavy lifting for 3 weeks.  RTC prn.    Patient mentioned getting frequent canker sores.  Wrote prescription for triamcinolone dental paste.  This should help.  Follow-up as necessary.    Leandro Hagan MD       Again, thank you for allowing me to participate in the care of your patient.        Sincerely,        Leandro Hagan MD    "

## 2022-09-11 ENCOUNTER — TRANSFERRED RECORDS (OUTPATIENT)
Dept: HEALTH INFORMATION MANAGEMENT | Facility: CLINIC | Age: 27
End: 2022-09-11

## 2022-09-11 ENCOUNTER — HOSPITAL ENCOUNTER (INPATIENT)
Facility: CLINIC | Age: 27
LOS: 3 days | Discharge: HOME | End: 2022-09-14
Attending: FAMILY MEDICINE | Admitting: FAMILY MEDICINE
Payer: COMMERCIAL

## 2022-09-11 PROBLEM — Z34.83 ENCOUNTER FOR SUPERVISION OF OTHER NORMAL PREGNANCY IN THIRD TRIMESTER: Status: ACTIVE | Noted: 2022-09-11

## 2022-09-11 PROBLEM — Z36.89 ENCOUNTER FOR TRIAGE IN PREGNANT PATIENT: Status: ACTIVE | Noted: 2022-09-11

## 2022-09-11 LAB
ABO/RH(D): ABNORMAL
ANTIBODY SCREEN: POSITIVE
RUPTURE OF FETAL MEMBRANES BY ROM PLUS: POSITIVE
SPECIMEN EXPIRATION DATE: ABNORMAL

## 2022-09-11 PROCEDURE — 120N000001 HC R&B MED SURG/OB

## 2022-09-11 PROCEDURE — 84112 EVAL AMNIOTIC FLUID PROTEIN: CPT | Performed by: FAMILY MEDICINE

## 2022-09-11 PROCEDURE — G0463 HOSPITAL OUTPT CLINIC VISIT: HCPCS

## 2022-09-11 PROCEDURE — 87635 SARS-COV-2 COVID-19 AMP PRB: CPT | Performed by: FAMILY MEDICINE

## 2022-09-11 RX ORDER — LIDOCAINE 40 MG/G
CREAM TOPICAL
Status: DISCONTINUED | OUTPATIENT
Start: 2022-09-11 | End: 2022-09-12

## 2022-09-11 RX ORDER — NALOXONE HYDROCHLORIDE 0.4 MG/ML
0.2 INJECTION, SOLUTION INTRAMUSCULAR; INTRAVENOUS; SUBCUTANEOUS
Status: DISCONTINUED | OUTPATIENT
Start: 2022-09-11 | End: 2022-09-12

## 2022-09-11 RX ORDER — NALOXONE HYDROCHLORIDE 0.4 MG/ML
0.4 INJECTION, SOLUTION INTRAMUSCULAR; INTRAVENOUS; SUBCUTANEOUS
Status: DISCONTINUED | OUTPATIENT
Start: 2022-09-11 | End: 2022-09-12

## 2022-09-11 RX ORDER — PROCHLORPERAZINE MALEATE 5 MG
10 TABLET ORAL EVERY 6 HOURS PRN
Status: DISCONTINUED | OUTPATIENT
Start: 2022-09-11 | End: 2022-09-12

## 2022-09-11 RX ORDER — KETOROLAC TROMETHAMINE 30 MG/ML
30 INJECTION, SOLUTION INTRAMUSCULAR; INTRAVENOUS
Status: DISCONTINUED | OUTPATIENT
Start: 2022-09-11 | End: 2022-09-12

## 2022-09-11 RX ORDER — OXYTOCIN/0.9 % SODIUM CHLORIDE 30/500 ML
340 PLASTIC BAG, INJECTION (ML) INTRAVENOUS CONTINUOUS PRN
Status: DISCONTINUED | OUTPATIENT
Start: 2022-09-11 | End: 2022-09-12

## 2022-09-11 RX ORDER — METOCLOPRAMIDE HYDROCHLORIDE 5 MG/ML
10 INJECTION INTRAMUSCULAR; INTRAVENOUS EVERY 6 HOURS PRN
Status: DISCONTINUED | OUTPATIENT
Start: 2022-09-11 | End: 2022-09-12

## 2022-09-11 RX ORDER — METOCLOPRAMIDE 5 MG/1
10 TABLET ORAL EVERY 6 HOURS PRN
Status: DISCONTINUED | OUTPATIENT
Start: 2022-09-11 | End: 2022-09-12

## 2022-09-11 RX ORDER — TRANEXAMIC ACID 10 MG/ML
1 INJECTION, SOLUTION INTRAVENOUS EVERY 30 MIN PRN
Status: DISCONTINUED | OUTPATIENT
Start: 2022-09-11 | End: 2022-09-12

## 2022-09-11 RX ORDER — MISOPROSTOL 200 UG/1
400 TABLET ORAL
Status: DISCONTINUED | OUTPATIENT
Start: 2022-09-11 | End: 2022-09-12

## 2022-09-11 RX ORDER — IBUPROFEN 800 MG/1
800 TABLET, FILM COATED ORAL
Status: DISCONTINUED | OUTPATIENT
Start: 2022-09-11 | End: 2022-09-12

## 2022-09-11 RX ORDER — ACETAMINOPHEN 325 MG/1
650 TABLET ORAL EVERY 4 HOURS PRN
Status: DISCONTINUED | OUTPATIENT
Start: 2022-09-11 | End: 2022-09-12

## 2022-09-11 RX ORDER — ONDANSETRON 4 MG/1
4 TABLET, ORALLY DISINTEGRATING ORAL EVERY 6 HOURS PRN
Status: DISCONTINUED | OUTPATIENT
Start: 2022-09-11 | End: 2022-09-12

## 2022-09-11 RX ORDER — FENTANYL CITRATE 50 UG/ML
50 INJECTION, SOLUTION INTRAMUSCULAR; INTRAVENOUS EVERY 30 MIN PRN
Status: DISCONTINUED | OUTPATIENT
Start: 2022-09-11 | End: 2022-09-12

## 2022-09-11 RX ORDER — CARBOPROST TROMETHAMINE 250 UG/ML
250 INJECTION, SOLUTION INTRAMUSCULAR
Status: DISCONTINUED | OUTPATIENT
Start: 2022-09-11 | End: 2022-09-12

## 2022-09-11 RX ORDER — METHYLERGONOVINE MALEATE 0.2 MG/ML
200 INJECTION INTRAVENOUS
Status: DISCONTINUED | OUTPATIENT
Start: 2022-09-11 | End: 2022-09-12

## 2022-09-11 RX ORDER — CITRIC ACID/SODIUM CITRATE 334-500MG
30 SOLUTION, ORAL ORAL
Status: DISCONTINUED | OUTPATIENT
Start: 2022-09-11 | End: 2022-09-12

## 2022-09-11 RX ORDER — ONDANSETRON 2 MG/ML
4 INJECTION INTRAMUSCULAR; INTRAVENOUS EVERY 6 HOURS PRN
Status: DISCONTINUED | OUTPATIENT
Start: 2022-09-11 | End: 2022-09-12

## 2022-09-11 RX ORDER — PROCHLORPERAZINE 25 MG
25 SUPPOSITORY, RECTAL RECTAL EVERY 12 HOURS PRN
Status: DISCONTINUED | OUTPATIENT
Start: 2022-09-11 | End: 2022-09-12

## 2022-09-11 RX ORDER — OXYTOCIN/0.9 % SODIUM CHLORIDE 30/500 ML
1-24 PLASTIC BAG, INJECTION (ML) INTRAVENOUS CONTINUOUS
Status: DISCONTINUED | OUTPATIENT
Start: 2022-09-12 | End: 2022-09-12

## 2022-09-11 RX ORDER — OXYTOCIN/0.9 % SODIUM CHLORIDE 30/500 ML
100-340 PLASTIC BAG, INJECTION (ML) INTRAVENOUS CONTINUOUS PRN
Status: DISCONTINUED | OUTPATIENT
Start: 2022-09-11 | End: 2022-09-12

## 2022-09-11 RX ORDER — CLINDAMYCIN PHOSPHATE 900 MG/50ML
900 INJECTION, SOLUTION INTRAVENOUS EVERY 8 HOURS
Status: DISCONTINUED | OUTPATIENT
Start: 2022-09-12 | End: 2022-09-12

## 2022-09-11 RX ORDER — OXYTOCIN 10 [USP'U]/ML
10 INJECTION, SOLUTION INTRAMUSCULAR; INTRAVENOUS
Status: DISCONTINUED | OUTPATIENT
Start: 2022-09-11 | End: 2022-09-12

## 2022-09-11 ASSESSMENT — ACTIVITIES OF DAILY LIVING (ADL): ADLS_ACUITY_SCORE: 35

## 2022-09-12 ENCOUNTER — ANESTHESIA EVENT (OUTPATIENT)
Dept: OBGYN | Facility: CLINIC | Age: 27
End: 2022-09-12
Payer: COMMERCIAL

## 2022-09-12 ENCOUNTER — ANESTHESIA (OUTPATIENT)
Dept: OBGYN | Facility: CLINIC | Age: 27
End: 2022-09-12
Payer: COMMERCIAL

## 2022-09-12 LAB
ANTIBODY ID: NORMAL
ERYTHROCYTE [DISTWIDTH] IN BLOOD BY AUTOMATED COUNT: 12.8 % (ref 10–15)
HCT VFR BLD AUTO: 35.7 % (ref 35–47)
HGB BLD-MCNC: 12.5 G/DL (ref 11.7–15.7)
MCH RBC QN AUTO: 29.1 PG (ref 26.5–33)
MCHC RBC AUTO-ENTMCNC: 35 G/DL (ref 31.5–36.5)
MCV RBC AUTO: 83 FL (ref 78–100)
PLATELET # BLD AUTO: 320 10E3/UL (ref 150–450)
RBC # BLD AUTO: 4.3 10E6/UL (ref 3.8–5.2)
SARS-COV-2 RNA RESP QL NAA+PROBE: NEGATIVE
SPECIMEN EXPIRATION DATE: NORMAL
T PALLIDUM AB SER QL: NONREACTIVE
WBC # BLD AUTO: 7.9 10E3/UL (ref 4–11)

## 2022-09-12 PROCEDURE — 250N000009 HC RX 250: Performed by: FAMILY MEDICINE

## 2022-09-12 PROCEDURE — 722N000001 HC LABOR CARE VAGINAL DELIVERY SINGLE

## 2022-09-12 PROCEDURE — 3E0R3BZ INTRODUCTION OF ANESTHETIC AGENT INTO SPINAL CANAL, PERCUTANEOUS APPROACH: ICD-10-PCS | Performed by: FAMILY MEDICINE

## 2022-09-12 PROCEDURE — 250N000013 HC RX MED GY IP 250 OP 250 PS 637: Performed by: FAMILY MEDICINE

## 2022-09-12 PROCEDURE — 250N000011 HC RX IP 250 OP 636: Performed by: NURSE ANESTHETIST, CERTIFIED REGISTERED

## 2022-09-12 PROCEDURE — 00HU33Z INSERTION OF INFUSION DEVICE INTO SPINAL CANAL, PERCUTANEOUS APPROACH: ICD-10-PCS | Performed by: FAMILY MEDICINE

## 2022-09-12 PROCEDURE — 120N000001 HC R&B MED SURG/OB

## 2022-09-12 PROCEDURE — 59409 OBSTETRICAL CARE: CPT | Performed by: FAMILY MEDICINE

## 2022-09-12 PROCEDURE — 258N000003 HC RX IP 258 OP 636: Performed by: NURSE ANESTHETIST, CERTIFIED REGISTERED

## 2022-09-12 PROCEDURE — G0463 HOSPITAL OUTPT CLINIC VISIT: HCPCS

## 2022-09-12 PROCEDURE — 86901 BLOOD TYPING SEROLOGIC RH(D): CPT | Performed by: FAMILY MEDICINE

## 2022-09-12 PROCEDURE — 250N000009 HC RX 250: Performed by: NURSE ANESTHETIST, CERTIFIED REGISTERED

## 2022-09-12 PROCEDURE — 85027 COMPLETE CBC AUTOMATED: CPT | Performed by: FAMILY MEDICINE

## 2022-09-12 PROCEDURE — 250N000011 HC RX IP 250 OP 636: Performed by: FAMILY MEDICINE

## 2022-09-12 PROCEDURE — 258N000003 HC RX IP 258 OP 636: Performed by: FAMILY MEDICINE

## 2022-09-12 PROCEDURE — 99221 1ST HOSP IP/OBS SF/LOW 40: CPT | Mod: 25 | Performed by: FAMILY MEDICINE

## 2022-09-12 PROCEDURE — 86780 TREPONEMA PALLIDUM: CPT | Performed by: FAMILY MEDICINE

## 2022-09-12 PROCEDURE — 370N000003 HC ANESTHESIA WARD SERVICE

## 2022-09-12 PROCEDURE — 86870 RBC ANTIBODY IDENTIFICATION: CPT | Performed by: FAMILY MEDICINE

## 2022-09-12 RX ORDER — IBUPROFEN 800 MG/1
800 TABLET, FILM COATED ORAL EVERY 6 HOURS PRN
Status: DISCONTINUED | OUTPATIENT
Start: 2022-09-12 | End: 2022-09-14 | Stop reason: HOSPADM

## 2022-09-12 RX ORDER — NALBUPHINE HYDROCHLORIDE 10 MG/ML
2.5-5 INJECTION, SOLUTION INTRAMUSCULAR; INTRAVENOUS; SUBCUTANEOUS EVERY 6 HOURS PRN
Status: DISCONTINUED | OUTPATIENT
Start: 2022-09-12 | End: 2022-09-12

## 2022-09-12 RX ORDER — PRENATAL VIT/IRON FUM/FOLIC AC 27MG-0.8MG
1 TABLET ORAL DAILY
Status: DISCONTINUED | OUTPATIENT
Start: 2022-09-12 | End: 2022-09-14 | Stop reason: HOSPADM

## 2022-09-12 RX ORDER — DOCUSATE SODIUM 100 MG/1
100 CAPSULE, LIQUID FILLED ORAL DAILY
Status: DISCONTINUED | OUTPATIENT
Start: 2022-09-12 | End: 2022-09-14 | Stop reason: HOSPADM

## 2022-09-12 RX ORDER — CARBOPROST TROMETHAMINE 250 UG/ML
250 INJECTION, SOLUTION INTRAMUSCULAR
Status: DISCONTINUED | OUTPATIENT
Start: 2022-09-12 | End: 2022-09-14 | Stop reason: HOSPADM

## 2022-09-12 RX ORDER — OXYTOCIN 10 [USP'U]/ML
10 INJECTION, SOLUTION INTRAMUSCULAR; INTRAVENOUS
Status: DISCONTINUED | OUTPATIENT
Start: 2022-09-12 | End: 2022-09-14 | Stop reason: HOSPADM

## 2022-09-12 RX ORDER — LIDOCAINE HYDROCHLORIDE AND EPINEPHRINE 15; 5 MG/ML; UG/ML
INJECTION, SOLUTION EPIDURAL PRN
Status: DISCONTINUED | OUTPATIENT
Start: 2022-09-12 | End: 2022-09-12

## 2022-09-12 RX ORDER — OXYTOCIN/0.9 % SODIUM CHLORIDE 30/500 ML
340 PLASTIC BAG, INJECTION (ML) INTRAVENOUS CONTINUOUS PRN
Status: DISCONTINUED | OUTPATIENT
Start: 2022-09-12 | End: 2022-09-14 | Stop reason: HOSPADM

## 2022-09-12 RX ORDER — BUPIVACAINE HYDROCHLORIDE 2.5 MG/ML
INJECTION, SOLUTION EPIDURAL; INFILTRATION; INTRACAUDAL PRN
Status: DISCONTINUED | OUTPATIENT
Start: 2022-09-12 | End: 2022-09-12

## 2022-09-12 RX ORDER — METHYLERGONOVINE MALEATE 0.2 MG/ML
200 INJECTION INTRAVENOUS
Status: DISCONTINUED | OUTPATIENT
Start: 2022-09-12 | End: 2022-09-14 | Stop reason: HOSPADM

## 2022-09-12 RX ORDER — EPHEDRINE SULFATE 50 MG/ML
INJECTION, SOLUTION INTRAMUSCULAR; INTRAVENOUS; SUBCUTANEOUS
Status: DISCONTINUED
Start: 2022-09-12 | End: 2022-09-12 | Stop reason: WASHOUT

## 2022-09-12 RX ORDER — MODIFIED LANOLIN
OINTMENT (GRAM) TOPICAL
Status: DISCONTINUED | OUTPATIENT
Start: 2022-09-12 | End: 2022-09-14 | Stop reason: HOSPADM

## 2022-09-12 RX ORDER — BISACODYL 10 MG
10 SUPPOSITORY, RECTAL RECTAL DAILY PRN
Status: DISCONTINUED | OUTPATIENT
Start: 2022-09-12 | End: 2022-09-14 | Stop reason: HOSPADM

## 2022-09-12 RX ORDER — MISOPROSTOL 200 UG/1
400 TABLET ORAL
Status: DISCONTINUED | OUTPATIENT
Start: 2022-09-12 | End: 2022-09-14 | Stop reason: HOSPADM

## 2022-09-12 RX ORDER — FENTANYL CITRATE-0.9 % NACL/PF 10 MCG/ML
100 PLASTIC BAG, INJECTION (ML) INTRAVENOUS EVERY 5 MIN PRN
Status: COMPLETED | OUTPATIENT
Start: 2022-09-12 | End: 2022-09-12

## 2022-09-12 RX ORDER — ACETAMINOPHEN 325 MG/1
650 TABLET ORAL EVERY 4 HOURS PRN
Status: DISCONTINUED | OUTPATIENT
Start: 2022-09-12 | End: 2022-09-14 | Stop reason: HOSPADM

## 2022-09-12 RX ORDER — TRANEXAMIC ACID 10 MG/ML
1 INJECTION, SOLUTION INTRAVENOUS EVERY 30 MIN PRN
Status: DISCONTINUED | OUTPATIENT
Start: 2022-09-12 | End: 2022-09-14 | Stop reason: HOSPADM

## 2022-09-12 RX ADMIN — Medication 100 MCG: at 04:36

## 2022-09-12 RX ADMIN — Medication 100 MCG: at 04:56

## 2022-09-12 RX ADMIN — LIDOCAINE HYDROCHLORIDE,EPINEPHRINE BITARTRATE 2 ML: 15; .005 INJECTION, SOLUTION EPIDURAL; INFILTRATION; INTRACAUDAL; PERINEURAL at 03:45

## 2022-09-12 RX ADMIN — IBUPROFEN 800 MG: 800 TABLET, FILM COATED ORAL at 16:55

## 2022-09-12 RX ADMIN — PHENYLEPHRINE HYDROCHLORIDE 300 MCG: 10 INJECTION INTRAVENOUS at 03:50

## 2022-09-12 RX ADMIN — PRENATAL VIT W/ FE FUMARATE-FA TAB 27-0.8 MG 1 TABLET: 27-0.8 TAB at 09:01

## 2022-09-12 RX ADMIN — BUPIVACAINE HYDROCHLORIDE 5 ML: 2.5 INJECTION, SOLUTION EPIDURAL; INFILTRATION; INTRACAUDAL at 03:45

## 2022-09-12 RX ADMIN — Medication 100 MCG: at 05:19

## 2022-09-12 RX ADMIN — BUPIVACAINE HYDROCHLORIDE 3 ML: 2.5 INJECTION, SOLUTION EPIDURAL; INFILTRATION; INTRACAUDAL at 03:48

## 2022-09-12 RX ADMIN — LIDOCAINE HYDROCHLORIDE 0.1 ML: 10 INJECTION, SOLUTION EPIDURAL; INFILTRATION; INTRACAUDAL; PERINEURAL at 01:00

## 2022-09-12 RX ADMIN — SODIUM CHLORIDE, POTASSIUM CHLORIDE, SODIUM LACTATE AND CALCIUM CHLORIDE 125 ML: 600; 310; 30; 20 INJECTION, SOLUTION INTRAVENOUS at 06:00

## 2022-09-12 RX ADMIN — Medication 10 ML/HR: at 03:49

## 2022-09-12 RX ADMIN — SODIUM CHLORIDE, POTASSIUM CHLORIDE, SODIUM LACTATE AND CALCIUM CHLORIDE 125 ML: 600; 310; 30; 20 INJECTION, SOLUTION INTRAVENOUS at 03:55

## 2022-09-12 RX ADMIN — PHENYLEPHRINE HYDROCHLORIDE 100 MCG: 10 INJECTION INTRAVENOUS at 04:05

## 2022-09-12 RX ADMIN — FENTANYL CITRATE 50 MCG: 50 INJECTION, SOLUTION INTRAMUSCULAR; INTRAVENOUS at 02:21

## 2022-09-12 RX ADMIN — Medication 2 MILLI-UNITS/MIN: at 01:01

## 2022-09-12 RX ADMIN — SODIUM CHLORIDE, POTASSIUM CHLORIDE, SODIUM LACTATE AND CALCIUM CHLORIDE 25 ML: 600; 310; 30; 20 INJECTION, SOLUTION INTRAVENOUS at 01:00

## 2022-09-12 RX ADMIN — Medication 100 MCG: at 05:52

## 2022-09-12 RX ADMIN — Medication 340 ML/HR: at 07:15

## 2022-09-12 RX ADMIN — ACETAMINOPHEN 650 MG: 325 TABLET, FILM COATED ORAL at 19:38

## 2022-09-12 RX ADMIN — LIDOCAINE HYDROCHLORIDE,EPINEPHRINE BITARTRATE 3 ML: 15; .005 INJECTION, SOLUTION EPIDURAL; INFILTRATION; INTRACAUDAL; PERINEURAL at 03:41

## 2022-09-12 RX ADMIN — DOCUSATE SODIUM 100 MG: 100 CAPSULE ORAL at 09:01

## 2022-09-12 ASSESSMENT — ACTIVITIES OF DAILY LIVING (ADL)
ADLS_ACUITY_SCORE: 18
ADLS_ACUITY_SCORE: 19
ADLS_ACUITY_SCORE: 18
ADLS_ACUITY_SCORE: 18
ADLS_ACUITY_SCORE: 19
ADLS_ACUITY_SCORE: 18

## 2022-09-12 NOTE — ANESTHESIA PREPROCEDURE EVALUATION
Anesthesia Pre-Procedure Evaluation    Patient: Cindy Tejeda   MRN: [id number] : 1995        Procedure : * No procedures listed *          No past medical history on file.   Past Surgical History:   Procedure Laterality Date     ENT SURGERY       LAPAROSCOPIC CHOLECYSTECTOMY N/A 2021    Procedure: Laparoscopic Cholecystectomy;  Surgeon: Leandro Hagan MD;  Location: [facility] OR      Allergies   Allergen Reactions     Penicillins      Penicillins Unknown and Other (See Comments)     Father is deathly allergic  Father has significant allergic reaction, pt has not ever been tested or taken any penicillin drugs  Family allergic.         Social History     Tobacco Use     Smoking status: Never Smoker     Smokeless tobacco: Never Used   Substance Use Topics     Alcohol use: Not Currently      Wt Readings from Last 1 Encounters:   21 63.5 kg (140 lb)        Anesthesia Evaluation   Pt has had prior anesthetic. Type: General and OB Labor Epidural.    No history of anesthetic complications       ROS/MED HX  ENT/Pulmonary:  - neg pulmonary ROS     Neurologic:  - neg neurologic ROS     Cardiovascular:  - neg cardiovascular ROS     METS/Exercise Tolerance:     Hematologic:  - neg hematologic  ROS     Musculoskeletal:       GI/Hepatic:  - neg GI/hepatic ROS     Renal/Genitourinary:       Endo:  - neg endo ROS     Psychiatric/Substance Use:  - neg psychiatric ROS     Infectious Disease:       Malignancy:       Other:            Physical Exam    Airway        Mallampati: II   TM distance: > 3 FB   Neck ROM: full   Mouth opening: > 3 cm    Respiratory Devices and Support         Dental  no notable dental history         Cardiovascular   cardiovascular exam normal          Pulmonary   pulmonary exam normal                OUTSIDE LABS:  CBC:   Lab Results   Component Value Date    WBC 8.8 2021    HGB 13.6 2021    HCT 39.7 2021     2021     BMP:   Lab Results   Component Value Date    NA  138 01/23/2021    POTASSIUM 3.5 01/23/2021    CHLORIDE 107 01/23/2021    CO2 20 (L) 01/23/2021    BUN 8 01/23/2021    CR 0.73 01/23/2021     01/23/2021     COAGS: No results found for: PTT, INR, FIBR  POC: No results found for: BGM, HCG, HCGS  HEPATIC: No results found for: ALBUMIN, PROTTOTAL, ALT, AST, GGT, ALKPHOS, BILITOTAL, BILIDIRECT, MAGDALENO  OTHER:   Lab Results   Component Value Date    JUICE 8.6 01/23/2021       Anesthesia Plan    ASA Status:  2      Anesthesia Type: Epidural.              Consents    Anesthesia Plan(s) and associated risks, benefits, and realistic alternatives discussed. Questions answered and patient/representative(s) expressed understanding.    - Discussed:     - Discussed with:  Patient         Postoperative Care            Comments:    Other Comments: The risks and benefits of anesthesia and labor analgesia, and the alternatives where applicable, have been discussed with the patient, and they wish to proceed.       neg OB ROS.       CYNDI Felton CRNA

## 2022-09-12 NOTE — H&P
"  2022    Cindy Hall  [id number]            OB Admit History & Physical      Ms. Hall  is here with SROM which occurred at 4 pm yesterday.    She has noticed clear fluid leaking, not feeling contractions on arrival.     No LMP recorded. Patient is pregnant.   Her Estimated Date of Delivery: Sep 17, 2022, making her 39w2d  wks.      Estimated body mass index is 24.03 kg/m  as calculated from the following:    Height as of 21: 1.626 m (5' 4\").    Weight as of 21: 63.5 kg (140 lb).  Her prenatal course has been uncomplicated.    See prenatal for labs.  Untested for GBBS, Rubella not Immune, RH negative, received rhogam at approx 32 weeks, 7.19.22    Estimated fetal weight= 6 lbs       She is a 27 year old   Her OB history:   OB History    Para Term  AB Living   4 3 3 0 0 3   SAB IAB Ectopic Multiple Live Births   0 0 0 0 3      # Outcome Date GA Lbr Roberto/2nd Weight Sex Delivery Anes PTL Lv   4 Current            3 Term 21 40w2d 11:53 / 00:01 3.11 kg (6 lb 13.7 oz) M Vag-Spont IV N CARTER      Name: LILIANA HALL      Apgar1: 9  Apgar5: 9   2 Term 19 41w5d 04:57 / 00:15 3.34 kg (7 lb 5.8 oz) M Vag-Spont Local N CARTER      Name: LILIANA HALL      Apgar1: 8  Apgar5: 9   1 Term 17 39w6d 29:35 / 01:01 2.807 kg (6 lb 3 oz) M Vag-Spont EPI N CARTER      Name: LILIANA HALL      Apgar1: 9  Apgar5: 9          No past medical history on file.       Past Surgical History:   Procedure Laterality Date     ENT SURGERY       LAPAROSCOPIC CHOLECYSTECTOMY N/A 2021    Procedure: Laparoscopic Cholecystectomy;  Surgeon: Leandro Hagan MD;  Location: [facility]         No current outpatient medications on file.       Allergies: Penicillins and Penicillins      REVIEW OF SYSTEMS:  NEUROLOGIC:  Negative  EYES:  Negative  ENT:  Negative  GI:  Negative  BREAST:  Negative  :  Negative  GYN:  Negative  CV:  Negative  PULMONARY:  Negative  MUSCULOSKELETAL:  Negative  PSYCH:  " Negative        Social History     Socioeconomic History     Marital status:      Spouse name: Not on file     Number of children: 3     Years of education: Not on file     Highest education level: Not on file   Occupational History     Not on file   Tobacco Use     Smoking status: Never Smoker     Smokeless tobacco: Never Used   Vaping Use     Vaping Use: Never used   Substance and Sexual Activity     Alcohol use: Not Currently     Drug use: Never     Sexual activity: Yes     Partners: Male   Other Topics Concern     Parent/sibling w/ CABG, MI or angioplasty before 65F 55M? No   Social History Narrative    ** Merged History Encounter **          Social Determinants of Health     Financial Resource Strain: Not on file   Food Insecurity: Not on file   Transportation Needs: Not on file   Physical Activity: Not on file   Stress: Not on file   Social Connections: Not on file   Intimate Partner Violence: Not on file   Housing Stability: Not on file      Family History   Problem Relation Age of Onset     Hypertension Father      Hypertension Brother          Vitals:   -150 with good variability  With contractions every 2-3 min    Alert Awake in NAD  HEENT grossly normal  Neck: no lymphadenopathy or thryoidomegaly  Lungs clear to asucultation  Heart RRR without murmur  ABD gravid, cephalic on exam  Pelvic: per last RN check, clear fluid noted, no blood noted  Cervix is 5 cm / 100 % effaced at -2 station on last exam per RN, now 9 cm, 100% effaced at -2 station  EXT:  no edema or calf tenderness  Neuro:  Intact x epidural    Assessment:  IUP at 39w2d  With SROM ,now s/p pitocin augmentation    Plan:  Expect  very soon.       Yeimi Malik MD  Dept of Family medicine  2022

## 2022-09-12 NOTE — L&D DELIVERY NOTE
Delivery Summary    Cindy Tejeda MRN# [id number]   Age: 27 year old YOB: 1995     ASSESSMENT & PLAN: Cindy Tejeda is a 27 year old  female who presents at 39w2d weeks with SROM.      Her prenatal course was uncomplicated.  She received her prenatal care at [facility] in [place]     Her routine prenatal labs were all normal as follows:   A NEG, received Rhogam  antibody screen negative  Rubella not immune   Trepab negative  She declined a GTT.   Her GBS is untested    On arrival at the birthplace her cervical exam is as follows:    Unreachable     She was initially allowed to labor spontaneously but was not oleg, so she had pitocin augmentation of labor.   She received epidural with good relief of her labor pain.   She had SROM at 4:00 pm prior to arrival.  She labored well and was completely dilated at 7:00 am.  First stage of labor was 0 hrs 50 min.     She pushed well with  viable male infant over an intact perineum at 07:11.  There was a loose nuchal cord that was easily reduced.  There was very little pressure with contractions, so mild shoulder dystocia occurred.  Baby was JACKSON with the right shoulder anterior.  We repositioned her into full Lashanda and had her push harder in the anterior shoulder delivered followed by posterior shoulder and body.  The infant was then laid on mother's abdomen, received drying and tactile stimulation.  Baby exhibited poor tone and did not respond immediately, so the cord was immediately clamped x 2 and cut by the FOB who was in attendance.  Baby was taken to warmer, further stimulated and dried, received brief nasal CPAP with good response. there was spontaneous cry and APGARS at 1 and 5 minutes were 7 and 9 respectively.   Second stage of labor was 0 hrs and 11 minutes.     The placenta delivered spontaneously, intact with a 3VC at 07:15.  Pitocin was given IV after delivery of the placenta.  Third stage of labor was 4 minutes.      Examination of the vaginal walls and perineum revealed no lacerations.  Fundal massage proved that the uterine fundus was firm.  EBL was 50 ml.      A:   viable male infant at 39 2/7 weeks gestation with APGARS of 7 and 9 weighing 7 lbs 4 oz.    P:  Mom, dad and baby are doing well after delivery.  Mom plans to breastfeed baby and baby will be rooming in with mom.  Parents desire circumcision for the baby but need to check insurance coverage.  I anticipate they will both have a normal course.      Yeimi Malik MD           Bentley Tejeda [[id number]]    Labor Event Times    Labor onset date: 22 Onset time:  6:10 AM   Dilation complete date: 22 Complete time:  7:00 AM   Start pushing date/time: 2022 0700      Labor Length    1st Stage (hrs): 0 (min): 50   2nd Stage (hrs): 0 (min): 11   3rd Stage (hrs): 0 (min): 4      Labor Events     labor?: No   steroids: None  Labor Type: Augmentation, Spontaneous  Predominate monitoring during 1st stage: continuous electronic fetal monitoring     Antibiotics received during labor?: No     Rupture identifier: Sac 1  Rupture date/time: 22 1600   Rupture type: Spontaneous rupture of membranes occuring during spontaneous labor or augmentation  Fluid color: Clear  Fluid odor: Normal     Augmentation: Oxytocin  Augmentation date/time: 22 0100   Indications for augmentation: Ineffective Contraction Pattern  1:1 continuous labor support provided by?: RN Labor partogram used?: no      Delivery/Placenta Date and Time    Delivery Date: 22 Delivery Time:  7:11 AM   Placenta Date/Time: 2022  7:15 AM  Oxytocin given at the time of delivery: after delivery of placenta  Delivering clinician: Yeimi Malik MD   Other personnel present at delivery:  Provider Role   Dahiana Lawrence, Shyla Dasilva RN          Vaginal Counts     Initial count performed by 2 team members:  Two Team Members  "  Essie Lawrence       Needles Suture Needles Sponges (RETIRED) Instruments   Initial counts 2 0 5    Added to count 0  0    Relief counts       Final counts 2  5          Placed during labor Accounted for at the end of labor   FSE No NA   IUPC No NA   Cervidil No NA              Final count performed by 2 team members:  Two Team Members   essie avelar      Final count correct?: Yes     Apgars    Living status: Living   1 Minute 5 Minute 10 Minute 15 Minute 20 Minute   Skin color: 1  1       Heart rate: 2  2       Reflex irritability: 2  2       Muscle tone: 1  2       Respiratory effort: 1  2       Total: 7  9       Apgars assigned by: JOSSELINE RN     Cord    Vessels: 3 Vessels    Cord Complications: Nuchal   Nuchal Intervention: reduced         Nuchal cord description: loose nuchal cord         Cord Blood Disposition: Lab    Gases Sent?: No    Delayed cord clamping?: No    Cord Clamping Delay (seconds): 1-30 seconds    Stem cell collection?: No       [place] Resuscitation    Methods: NCPAP, Oxygen  Output in Delivery Room: Voided, Stool     [place] Measurements    Weight: 7 lb 4.4 oz Length: 1' 8.5\"   Head circumference: 33 cm    Output in delivery room: Voided, Stool     Skin to Skin and Feeding Plan    Skin to skin initiation date/time: 1841    Skin to skin with: Mother  Skin to skin end date/time: 1841    Breastfeeding initiated date/time: 2022 0815     Labor Events and Shoulder Dystocia    Fetal Tracing Prior to Delivery: Category 2  Shoulder dystocia present?: Pos  Anterior shoulder: right Time body delivered: 0711 CDT   Time head delivered: 0711 CDT Help called by: already present   Time recognized: 2022 0711    Time help called: 2022 0711    Gentle attempt at traction, assisted by maternal expulsive forces?: Yes       First Maneuver: Lashanda maneuver    Time performed: 2022 0711    Performed by: patient and both nurses       Delivery (Maternal) (Provider to " Complete) ([id number])    Episiotomy: None  Perineal lacerations: None    Repair suture: None  Genital tract inspection done: Pos     Blood Loss  Mother: Cindy Tejeda P #[id number]   Start of Mother's Information    Delivery Blood Loss  09/12/22 0610 - 09/12/22 0939    Delivery QBL (mL) Hospital Encounter 25 mL    Total  25 mL         End of Mother's Information  Mother: Cindy Tejeda #[id number]          Delivery - Provider to Complete ([id number])    Delivering clinician: Yeimi Malik MD  Attempted Delivery Types (Choose all that apply): Spontaneous Vaginal Delivery  Delivery Type (Choose the 1 that will go to the Birth History): Vaginal, Spontaneous                   Other personnel:  Provider Role   Dahiana Lawrence RN Muench, Hailey Jean N, RN                 Placenta    Date/Time: 9/12/2022  7:15 AM  Removal: Spontaneous  Disposition: Hospital disposal           Anesthesia    Method: Epidural  Cervical dilation at placement: 0-3                Presentation and Position    Presentation: Vertex    Position: Left Occiput Anterior                 Yeimi Malik MD

## 2022-09-12 NOTE — PROGRESS NOTES
S: Triage Arrival    B: Cindy is a 27 y.o.  @ 39w 1d who presents with complaint of leaking fluid vaginally.    A: EFM applied. FHT's130 with moderate variability, accels present, no decels noted on strip. Contractions rare, irritability present. SROM at 1600 today, clear fluid, moderate amount. ROM+ positive. Planned to deliver at [facility] but they are on divert.     R: report given to receiving RN who will notify MD to obtain admission orders and make a POC.

## 2022-09-12 NOTE — PROGRESS NOTES
S: Transfer to postpartum  B: Vaginal birth @ 0711, no tear, no repair, breast feeding    A: Mother and baby transferred to postpartum unit at 1130 via wheelchar after completion of immediate recovery period. Patient oriented to room. Mother and baby bonding well and in satisfactory condition upon transfer.  R: Anticipate routine postpartum care.

## 2022-09-12 NOTE — ANESTHESIA PROCEDURE NOTES
Epidural catheter Procedure Note    Pre-Procedure   Staff -        CRNA: Connie Howe APRN CRNA       Performed By: CRNA       Location: OB       Procedure Start/Stop Times: 9/12/2022 3:30 AM and 9/12/2022 3:45 AM       Pre-Anesthestic Checklist: patient identified, IV checked, risks and benefits discussed, informed consent, monitors and equipment checked, pre-op evaluation and at physician/surgeon's request  Timeout:       Correct Patient: Yes        Correct Procedure: Yes        Correct Site: Yes        Correct Position: Yes   Procedure Documentation  Procedure: epidural catheter       Diagnosis: Labor Pain       Patient Position: sitting       Patient Prep/Sterile Barriers: sterile gloves, mask, patient draped       Skin prep: Betadine       Local skin infiltrated with 3 mL of 1% lidocaine.        Insertion Site: L3-4. (midline approach).       Technique: LORT air        KATHY at 6 cm.       Needle Type: Shalom needle and Santana       Needle Gauge: 18.        Needle Length (Inches): 3.5        Catheter: 20 G.          Catheter threaded easily.         4 cm epidural space.           # of attempts: 1 and  # of redirects:  0    Assessment/Narrative         Paresthesias: No.       Test dose of 3 mL lidocaine 1.5% w/ 1:200,000 epinephrine at.         Test dose negative, 3 minutes after injection, for signs of intravascular, subdural, or intrathecal injection.       Insertion/Infusion Method: LORT air       Aspiration negative for Heme or CSF via Epidural Catheter.       Sensory Level Left: T6.       Sensory Level Right: T6.    Medication(s) Administered   Medication Administration Time: 9/12/2022 3:30 AM     Comments:  Patient sitting at bedside. Risks, benefits and alternatives of epidural analgesia discussed with patient  Her questions were answered, and she consents/wishes to proceed as planned.  Back exam landmarks identified, skin prep with betadine.  Lidocaine infiltration at L3/4. ES identified via KATHY  (Air) after 1 attempt (s) and 0 redirect (s).  EC passed 4 centimeters without paresthesias or resistance noted.  Negative heme / CSF aspirated.  EC securred with tegaderm/medipore tape. Patient appeared to tolerate procedure well. No apparent complications.

## 2022-09-12 NOTE — PROGRESS NOTES
S: Delivery  B: augmented Labor,  @ [id number] gestation, GBS unknownwith no antibiotic treatment within 4 hours prior to delivery. Pt declined  A: Patient delivered Vaginal at 0711 with Dr. Malik in attendance. Baby delivered and placed on mother's low abdomen for delayed cord clamping where baby was dried and stimulated. After cord clamped and cut, baby was placed skin to skin on mother's chest within 5 minutes following delivery . Apgars 9/9. Placenta was delivered @ 0716 followed by administration of oxytocin. Bonding initiated with mom and baby. Educated mother on importance of exclusive breast feeding, expected feeding readiness cues and encouraged her to observe for feeding cues. Mother informed that breast feeding assistance would be provided. See flowsheet for VS and PP checks. Labor care plan goals met. Slight shoulder dystocia.  R: Expect routine postpartum care. Anticipate first feeding within the hour.

## 2022-09-12 NOTE — PROGRESS NOTES
S: Shift review   B:Cindy is a  who delivered vaginally today  A: VSS, declines need for pain medication so far. Handles baby with confidence. Voiding without difficulty. Fundus firm and lochia light. IV saline locked. Will need rhophylac once lab has processed.  R: Continue with routine PP care

## 2022-09-12 NOTE — PROGRESS NOTES
Because of cervical dilation, I called Dr Malik to come in.  Also notified MARVIN Garcia that SBP<100, Paul-Synephrine has been given 4 times, yet MAP is staying>60 and both patient and baby are asymptomatic.  OK with CRNA if asymptomatic and MAP>60

## 2022-09-12 NOTE — PROGRESS NOTES
BP low, gave Phenalefren and giving 1 L fluid bolus.  Pt tilted to left side.  Sleeping now.  Will monitor closely.  Also increased Pitocin as membranes have been ruptured >12 hours.

## 2022-09-12 NOTE — PROGRESS NOTES
S:  Admission  B:  Cindy is a  @ 39w1d gestation, GBS unknown-declines antibiotic treatment, Hep. B -, pregnancy uncomplicated. EFW TBD per MD  A:  Patient admitted to room 332 for augmentation of labor.  SROM at 1600.  Transferred care here from another facility.  Would like epidural  R: Unknown sex of baby.  Dr Malik is on call, called her with report.  See orders. Pt walking the halls right now.

## 2022-09-13 LAB
ABO/RH(D): NORMAL
FETAL BLEED SCREEN: NORMAL
HGB BLD-MCNC: 12.3 G/DL (ref 11.7–15.7)
SPECIMEN EXPIRATION DATE: NORMAL

## 2022-09-13 PROCEDURE — 250N000011 HC RX IP 250 OP 636: Performed by: FAMILY MEDICINE

## 2022-09-13 PROCEDURE — 250N000013 HC RX MED GY IP 250 OP 250 PS 637: Performed by: FAMILY MEDICINE

## 2022-09-13 PROCEDURE — 120N000001 HC R&B MED SURG/OB

## 2022-09-13 PROCEDURE — 36415 COLL VENOUS BLD VENIPUNCTURE: CPT | Performed by: FAMILY MEDICINE

## 2022-09-13 PROCEDURE — 85461 HEMOGLOBIN FETAL: CPT | Performed by: FAMILY MEDICINE

## 2022-09-13 PROCEDURE — 85018 HEMOGLOBIN: CPT | Performed by: FAMILY MEDICINE

## 2022-09-13 PROCEDURE — 86901 BLOOD TYPING SEROLOGIC RH(D): CPT | Performed by: FAMILY MEDICINE

## 2022-09-13 RX ADMIN — ACETAMINOPHEN 650 MG: 325 TABLET, FILM COATED ORAL at 10:33

## 2022-09-13 RX ADMIN — IBUPROFEN 800 MG: 800 TABLET, FILM COATED ORAL at 14:19

## 2022-09-13 RX ADMIN — ACETAMINOPHEN 650 MG: 325 TABLET, FILM COATED ORAL at 03:48

## 2022-09-13 RX ADMIN — HUMAN RHO(D) IMMUNE GLOBULIN 300 MCG: 1500 SOLUTION INTRAMUSCULAR; INTRAVENOUS at 10:43

## 2022-09-13 RX ADMIN — IBUPROFEN 800 MG: 800 TABLET, FILM COATED ORAL at 00:39

## 2022-09-13 RX ADMIN — IBUPROFEN 800 MG: 800 TABLET, FILM COATED ORAL at 08:28

## 2022-09-13 RX ADMIN — DOCUSATE SODIUM 100 MG: 100 CAPSULE ORAL at 08:27

## 2022-09-13 RX ADMIN — PRENATAL VIT W/ FE FUMARATE-FA TAB 27-0.8 MG 1 TABLET: 27-0.8 TAB at 08:27

## 2022-09-13 ASSESSMENT — ACTIVITIES OF DAILY LIVING (ADL)
ADLS_ACUITY_SCORE: 18
ADLS_ACUITY_SCORE: 19
ADLS_ACUITY_SCORE: 18
ADLS_ACUITY_SCORE: 19
ADLS_ACUITY_SCORE: 18
ADLS_ACUITY_SCORE: 18

## 2022-09-13 NOTE — DISCHARGE INSTRUCTIONS
Behavioral/Mental Health Resources    [facility] Discharge Instructions     Discharge disposition:  Discharged to home       Diet:  Regular       Activity Pelvic rest: abstain from intercourse and do not use tampons for 6 week(s)       Follow-up: Follow up with primary care provider in 6 weeks, earlier as needed       Additional instructions: Please call the clinic at [phone] if you have any concerns or question.            Postpartum Vaginal Delivery Instructions    Activity     Ask family and friends for help when you need it.  Do not place anything in your vagina for 6 weeks.  You are not restricted on other activities, but take it easy for a few weeks to allow your body to recover from delivery.  You are able to do any activities you feel up to that point.  No driving until you have stopped taking your pain medications (usually two weeks after delivery).     Call your health care provider if you have any of these symptoms:     Increased pain, swelling, redness, or fluid around your stiches from an episiotomy or perineal tear.  A fever above 100.4 F (38 C) with or without chills when placing a thermometer under your tongue.  You soak a sanitary pad with blood within 1 hour, or you see blood clots larger than a golf ball.  Bleeding that lasts more than 6 weeks.  Vaginal discharge that smells bad.  Severe pain, cramping or tenderness in your lower belly area.  A need to urinate more frequently (use the toilet more often), more urgently (use the toilet very quickly), or it burns when you urinate.  Nausea and vomiting.  Redness, swelling or pain around a vein in your leg.  Problems breastfeeding or a red or painful area on your breast.  Chest pain and cough or are gasping for air.  Problems coping with sadness, anxiety, or depression.  If you have any concerns about hurting yourself or the baby, call your provider immediately.   You have questions or concerns after you return  home.     Keep your hands clean:  Always wash your hands before touching your perineal area and stitches.  This helps reduce your risk of infection.  If your hands aren't dirty, you may use an alcohol hand-rub to clean your hands. Keep your nails clean and short.              Breastfeeding FAQs  How often should I nurse?  Feed your  whenever he or she show signs of hunger. A general guideline is to nurse around 8 to 12 times every 24 hours, or about every 2 to 3 hours. It's important to wake newborns to nurse at least every 3 hours until they are back to their birth weight. Once newborns have shown that they will demand enough milk to grow adequately, they will not need to be awakened to eat, and will often space out feedings as their hunger allows them. With time and patience, every mother-baby pair will develop their own schedule and feeding pattern.   How many months should I nurse?  The American College of Obstetricians and Gynecologists (ACOG) and the American Academy of Pediatrics (AAP) both recommend that mothers start breastfeeding as soon as possible after birth, ideally within the first hour. Research has shown that time skin-to-skin after delivery helps to encourage this. Both organizations encourage  breastfeeding-only  with no other supplements for healthy newborns for the first 6 months of life. At 6 months, your baby may slowly start having solid foods as well. But continue breastfeeding at least through the baby s first birthday. After the first birthday, you and your baby can stop or continue breastfeeding as long as you want it to happen.   Is my baby getting enough milk?  There are a few ways to check if your baby is getting enough milk.  Latching on  You know your baby is getting milk if you hear gulping and swallowing sounds, not just sucking. Look for your baby steadily moving his or her jaw open and closed as another sign of proper  latching on.  If the latch is painful, it's important  to ask for help from a lactation consultant or your healthcare provider. It could be a sign that your baby is not transferring milk from your breast well. Most of the time, this issue is common and can be easily managed with a little help.   Urine output and stool frequency  You can also tell how much milk your baby is getting by keeping track of your baby s diapers. By the end of the first week of life:   Your baby should have about 1 wet diaper on day 1, and 2 wet diapers on day 2. This should increase each day by 1 more wet diaper, up to 6 wet diapers on day 6 and then about 6 wet diapers every day. The urine will be a pale yellow color, not dark yellow or orange. Wet diapers should stay around this amount as your  baby gets older.  Your baby should have 3 or 4 stools per day that are at least a teaspoon in amount (not just smears). By the 4th day of life, the black-brown meconium early stool should start to transition to a greenish color, then within another day or two to a loose yellow stool with curds. This is a normal  stool. It's not uncommon for a  baby to pass stool after each feeding. In the second to fourth week of life, the number of stools can increase to about 5 per day. After 1 month, the number of stools usually lessens. It might be 1 or 2 a day. Some babies may have a day or days with no stool. In these cases, stool should be in larger amounts when passed.  Weight  It s normal for your baby to lose some weight during the first 3 to 4 days of life. A baby might normally lose up to 7% to 10% of his or her birth weight during this time. Then your baby should start gaining again. By the end of the second week, he or she should be back to birth weight. In the weeks following this, he or she will gain about a half ounce to an ounce per day.   Does my baby need vitamins?  All  infants should take 400 international units (IU) a day of vitamin D (infant formulation). Your  healthcare provider may prescribe this or it may be purchased over the counter.     When your baby is 6 months old, you may start to offer baby foods that contain iron. You should discuss the possible need for iron supplementation with your infant's healthcare provider.   What should I do if my breasts become swollen, tender, or sore?  These symptoms are most often because your breasts are too full of milk (engorged). This is common when your milk first comes in or if you are making more milk than your baby is drinking. This may cause swelling and make it harder for your baby to nurse. If this happens, try the following:   Keep nursing. This is a temporary condition. It gets better once you can get your baby to drink more milk. Be sure to breastfeed more often and let your baby feed until he or she is finished. Be sure to massage your breast while feeding to help the breast drain as fully as possible.  Express some milk before you breastfeed. Do this manually or with a breast pump. This will soften the darker area around the nipple (areola) so your baby can latch deeper to begin feeding.  Use a warm compress. This can be a towel or paper diaper soaked in warm water. Or take a warm shower before feeding. Some women have found that switching off between a cold compress and a warm one gives them relief. If you feel comfortable with this, you can try it too. If you use an ice pack, wrap it in a thin towel to protect your skin.  Take acetaminophen or ibuprofen for continued pain. The medicine is safe to take during breastfeeding.  If your nipples or breasts continue to hurt, call your healthcare provider. Nipple and breast problems are urgent and need to be looked at and treated as soon as possible.   When to seek medical advice  Unless your baby s healthcare provider advises otherwise, call the provider right away if your baby has any of the following:   Fever (see Fever and children, below)  Repeated vomiting  Does not  appear to be alert, refuses to nurse, or is sleeping too much, such as through feedings  Has signs of dehydration. These include fewer wet diapers than normal or no urine for 8 hours, or the urine appears dark. Or your baby has no tears when crying,  sunken eyes,  or dry mouth.  Is not gaining weight or losing weight  Call your own healthcare provider right away if you:   Have a fever of 100.4 F (38 C) or higher, or as directed by your provider  Have redness, warmth, pain, or unusual discharge from your breasts  Have such painful nipples and breasts that you want to stop nursing  Have a hard lump in your breast  Have lower belly (abdominal) pain or cramping when you are not breastfeeding  Have pain or burning when you pass urine  Have unexpected vaginal bleeding or foul-smelling discharge  Fever and children  Use a digital thermometer to check your child s temperature. Don t use a mercury thermometer. There are different kinds and uses of digital thermometers. They include:   Rectal. For children younger than 3 years, a rectal temperature is the most accurate.  Forehead (temporal). This works for children age 3 months and older. If a child under 3 months old has signs of illness, this can be used for a first pass. The provider may want to confirm with a rectal temperature.  Ear (tympanic). Ear temperatures are accurate after 6 months of age, but not before.  Armpit (axillary). This is the least reliable but may be used for a first pass to check a child of any age with signs of illness. The provider may want to confirm with a rectal temperature.  Mouth (oral). Don t use a thermometer in your child s mouth until he or she is at least 4 years old.  Use the rectal thermometer with care. Follow the product maker s directions for correct use. Insert it gently. Label it and make sure it s not used in the mouth. It may pass on germs from the stool. If you don t feel OK using a rectal thermometer, ask the healthcare provider  "what type to use instead. When you talk with any healthcare provider about your child s fever, tell him or her which type you used.   Below are guidelines to know if your young child has a fever. Your child s healthcare provider may give you different numbers for your child. Follow your provider s specific instructions.   Fever readings for a baby under 3 months old:   First, ask your child s healthcare provider how you should take the temperature.  Rectal or forehead: 100.4 F (38 C) or higher  Armpit: 99 F (37.2 C) or higher  Fever readings for a child age 3 months to 36 months (3 years):   Rectal, forehead, or ear: 102 F (38.9 C) or higher  Armpit: 101 F (38.3 C) or higher  Call the healthcare provider in these cases:   Repeated temperature of 104 F (40 C) or higher in a child of any age  Fever of 100.4  F (38  C) or higher in baby younger than 3 months  Fever that lasts more than 24 hours in a child under age 2  Fever that lasts for 3 days in a child age 2 or older  [employer] last reviewed this educational content on 2020-2021 [employer]. All rights reserved. This information is not intended as a substitute for professional medical care. Always follow your healthcare professional's instructions.            2006 [place] [facility] [employer]. Reprinted with permission. [employer] [id number] - REV 04/10.  Postpartum Depression  When Caring for Your Baby Is Not What You Expected  Stories from other mothers  \"This was my third baby, but it wasn't the happy, joyful experience I had expected. I felt anxious and irritable. I didn't want to get out of bed in the morning. I didn't feel connected to my baby.\" - Treva  \"We were thrilled to bring home our first healthy baby. But in those first few weeks I felt tired and cried easily. I thought it was just the hormones and getting used to a . After six months, when little things would still set me off, my  convinced me to talk to my " "doctor.\" - Daisy  \"I love children and couldn't wait to have my own. Then my  went back to work, and I started having thoughts about hurting my baby. No matter what I did, I couldn't stop the thoughts. I lived in fear but kept it a secret.\" - Alexandra  \"It has been two months since I saw my doctor, and I feel like a different person. The medicine has helped and my family has been very supportive. I have energy again, and I love being a mother.\" - Emily  You are not alone  Although postpartum depression is common, it is serious--and treatable. If you think you might have it, tell your doctor or another health care provider. With help, you can feel like yourself again.   The baby blues   Having a baby brings big changes in a woman's life. These changes can be overwhelming. While most moms get past the \"baby blues\" within the first two weeks, some moms struggle for longer.   If the baby blues last longer than two weeks, you may have postpartum depression.  Postpartum depression  It is easy to confuse the symptoms of postpartum depression with normal hormone changes. How can you tell if it's serious? Watch for these symptoms:  Feeling sad, anxious or \"empty\"  Lack of energy, feeling very tired  Lack of interest in normal activities  Changes in sleeping or eating patterns  Feeling hopeless, helpless, guilty or worthless  Feeling bai and irritable  Problems concentrating or making simple decisions  Thoughts about hurting your baby, even if you will not act on them  Thoughts about death or suicide  Things you can do  Being a good mom means taking care of yourself. If you take care of yourself, you can take better care of your baby and your family.  Get help. Talk with your care provider, call an emergency support line or ask a loved one to help you get the care you need.  Ask your care provider about medicines that can be safely used for postpartum depression.  Talk to a therapist, alone or in group therapy.  Ask " "your kavitha or community leaders about other support resources.  Learn as much as you can about postpartum depression.  Get support from family and friends. Ask for help when you need it.  Keep active by walking, stretching, swimming and so on.  Get enough rest.  Eat a healthy diet.  Don't give up! It may take more than one try to get the help you need.  What you should know  It is very common for new moms to have the \"baby blues.\" They often start a few days after a baby's birth. Usually, feeling sad and irritable will not stop you from taking care of your baby or yourself.   If symptoms interfere with your life or last longer than two to three weeks, you may have postpartum depression. This affects up to 2 out of 10 moms. It can occur any time in your baby's first year.   Women who have a history of depression are more likely to become depressed during pregnancy or after birth. Depression can be caused by stress, hormone changes, trauma, lack of support and other factors.   If you are depressed, you need to get help. It will not get better on its own.  The best treatment   The most effective treatment for depression includes:  Individual or group therapy  Medicine that can be safely used while breastfeeding (prescribed by your doctor)  Support from your family, friends and community  Who to contact for help   Crisis Connection: 1-123.436.4475; -548-3124  [facility]: [phone] (Specialists in postpartum depression offer individual, couples and group therapy)   [facility]: www.amyeWings.com.org  National Suicide Prevention Lifeline: 0-718-211-TALK [7141]  PPD Hope Information Center: www.ppdhope.com  Pregnancy and Postpartum Support [place]: www.pregnancypostpartumsupportmn.org  This brochure meets the requirements of [place] Statute 145.906. For more information, call the [place] Department of Health at 665-057-9875 or visit our website at " www.health.[facility].mn.us/divs/fh/mch/.          [place], [place], [facility], [facility], [place], [place], [facility], [place] and [facility]    **Patient should check with their health insurance to identify providers in network**    Mental Health Crisis Numbers:  [place] after hours Crisis Line      [phone]     Options For Deaf + Hard of Hearing    1-339.994.4174    Text [place] to 689244      24/7 Crisis Texting line    Trans Lifeline  (Fight the epidemic of trans suicide)  1-579.615.2259    https://www.translifeline.org/    The [facility]OM HelpLine can be reached Monday through Friday, 10 am-6 pm, ET.  1-217-757-SHALOM (2977) or info@shalom.org    National Suicide Prevention LifeLine       Call the National Suicide Prevention Lifeline at 1-146-976-FLEK (9385) to be connected to a counselor at a crisis center in your area if you, a family member, or friend are experiencing thoughts of suicide. The call is FREE, confidential, and always available.     *Fast-Tracker is an online mental health/chemical dependency resource site. Mental health providers, care coordinators and consumers can go to www.fast-trackermn.org  to search for community mental health providers and information with a real-time, searchable directory of mental health resources and their availability within [place]*    Crisis Mobile Services:  [facility]   [phone]  [facility]   [phone]  [facility]       [phone]  [facility]        SAME AS ABOVE  [facility]       SAME AS ABOVE  [facility]      SAME AS ABOVE  [facility]      SAME AS ABOVE  [place] and [place]:      [phone] or [phone]  Other Counties:    [place]-  Adults   [phone]   Children [phone]   [place]-  Adults  [phone]  Children [phone]     Pregnancy & Post-partum Support     Helpline 999-978-8363  Farm & Rural Helpline NEW 3-2019    [phone]       Chemical Health Services:    Medicare &  Chemical Health Assessments:  [facility]   [address]   [place], [place]   [phone]      [facility]    [address], [place]  [phone]  [facility]          [address]     [place], [place]  [phone]    Rule 25 and/or Chemical Health Assessment:  If a person has insurance, s/he must contact their insurance companies Behavioral Health division and follow recommendations for a chemical health assessment and then follow the recommendations of the .    If a person does NOT have insurance, they must get a Rule 25 (state funded chemical health assessment).  They can contact their County of Residence's Chemical Health Unit to discover who their [facility] networks to conduct the assessment.      Chemical Dependency Detox:  [facility]:  [phone] - can ask for other recommendations  [facility]/[place]([facility]):  [phone]  [facility] ([facility]):  [phone]  [facility] : [phone] [facility]):  [phone]  [place] ([facility]):  [phone]    Chemical Dependency Assessment:   [place] [facility]: [phone]   Behavioral Health Providers, can help find a provider near you:  [phone]  No insurance- call county of residence and ask about applying for a Rule 25    Counseling/psychotherapy:  Associated clinic of psychology - [place],/[place]/ [facility]/[place] /other locations: [phone]  Behavioral Healthcare Providers- Can help find a provider near you:  [phone]  [facility] Services-[place]:  [phone]  [facility]- [place]:  [phone]  [facility]- [place]:  [phone]  [facility]-[place]: [phone]  [facility]- [place]/[facility]/other locations:  [phone]  [facility]- [facility]/[place]:   [phone]  [facility]/[place]:  [phone]  [facility]:  [phone]  [facility]- [place]-based in [place]:  [phone]  [phone]  [facility]: [phone]  [facility]- [place] [phone]  [facility] counseling located in [place] (not affiliated with [place]): [phone]  [place]- [place]:  [phone]/[place]: [phone] and other locations.  [facility]: [phone]  [facility]iatric Recovery- [place]:  [phone]  [facility]- [facility]/[place]: [phone]  Walk in counseling center (Free counseling services)- [facility]: [phone]     Psychiatry/ Mental Health Medication management:  Associated clinic of psychology- [place],/[place]/[facility]/ [place]/ [facility] locations: [phone]  Behavioral Healthcare Providers- Can help find a provider near you, if you have preferred one or [facility] they can identify who is in network and assist with scheduling an appointment:  [phone]  [facility]: [place]/[facility] locations : [phone]  [facility] -Collaborative model with PCP involvement:  [phone] (requires PCP referral specifically)  [facility]:  [phone]  [place]: [phone] [place]/[facility] [address]:  [phone]/[place]:  [phone]/ [place]:  [phone]  [facility]- [place]:   [phone]  [facility]- [place], [phone]  [facility] Psychiatry - Medical students who rotate yearly:  [phone]    County Numbers:  [facility]: [phone]  [facility]: [phone]  [facility]: [phone]  [facility] : [phone]  [facility]: [phone]  [facility]: [phone]  [facility] [place]:  [phone]  [facility] [place] [facility]: [phone]  [facility]: [phone]  [facility]: [phone]      Chemical Dependency  [facility] and [place]                         [phone] [facility]                                                                 [address]                                             [place] [address]  [phone]   [address] [place]   [place]: [phone]  [place]: [phone] [facility]   [place]: [phone]   Sob[facility]ety [facility]   [place]: [phone]   Alcohol Hotline   0-109-332-4192     Teen Chemical Dependency  [facility]   [place]: [phone]   [place] Teen Challenge   Teen and adult chemical dependency   [phone]   Mental Health  [facility] Mental [address] [address]  [phone]   [facility] [place]   24 hour mental health crisis services   [phone] or [phone]  [place]: [phone]  [place]: [phone]  [place]: [phone]     [facility]: [phone]  [place]: [phone]   [facility]: [phone]     National Suicide Prevention Lifeline   1-399.184.5801 1-960.896.8000 Deaf/ hard of hearing    Text Crisis hotline   Text home to 483686      Disaster Distress helpline   4-016-666-3702  Text TalkWithUs to 85112   [facility] Crisis [facility]   1-439.772.1717   Dual (Chemical Dependency & Mental Health)  [place]  Chemical dependency, depression, anger, trauma, loss   [place]: [phone]  [place]: [phone] Women s Recovery and Support Services   Chemical dependency and mental health   [place]: [phone]     **Please note that this list does not include all agencies that provide services**    Care Transitions Team at [facility] [phone]

## 2022-09-13 NOTE — ANESTHESIA POSTPROCEDURE EVALUATION
Patient: Cindy Tejeda    Procedure: * No procedures listed *       Anesthesia Type:  Epidural    Note:  Disposition: Outpatient   Postop Pain Control: Uneventful            Sign Out: Well controlled pain   PONV: No   Neuro/Psych: Uneventful            Sign Out: Acceptable/Baseline neuro status   Airway/Respiratory: Uneventful            Sign Out: Acceptable/Baseline resp. status   CV/Hemodynamics: Uneventful            Sign Out: Acceptable CV status   Other NRE: NONE   DID A NON-ROUTINE EVENT OCCUR? No    Event details/Postop Comments:  Pt was happy with anesthesia care.  No complications.  I will follow up with the pt if needed.           Last vitals:  Vitals:    09/13/22 0351 09/13/22 0352 09/13/22 0745   BP: 123/63  124/66   Pulse: 73  77   Resp: 16  16   Temp: 98.3  F (36.8  C)  96.8  F (36  C)   SpO2:  94%        Electronically Signed By: CYNDI Craft CRNA  September 13, 2022  9:37 AM

## 2022-09-13 NOTE — PROGRESS NOTES
S: Shift Note  B: Cindy is a  that delivered via  at 39w2d  A:/63   Pulse 73   Temp 98.3  F (36.8  C) (Oral)   Resp 16   SpO2 94%   Breastfeeding Unknown    Cindy has been vitally stable this shift. Managing lower abdominal cramping with tylenol and ibuprofen as needed with adequate relief per patient. Vaginal bleeding has been light this shift. Voiding without difficulty. Tolerating regular diet. IV saline locked. Performing self and infant cares independently in room.   R: Continue postpartum cares.

## 2022-09-13 NOTE — CONSULTS
Reason for Referral: SW consult for  postpartum assessment due to score of 13 on the EPDS    Marital Status:     Children: 4    Children Living with Patient: Yes    Education: Unknown    Occupation: stay at home Mom    ESTABLISHED PROVIDERS    Psychiatrist: No   Provider Name: None   Therapist: No   Provider Name: None     Other Providers:  (, CADI worker, group home, guardian/conservator etc)  Yes - [facility]    Mental Health Complaints: anxiety    Suicide: Evidence of Ideation: No    History of Suicide Attempt: No    Homicide: Evidence of ideation: No    History of commitment: No    History of psychiatric hospitalization: No    General/Affect: Appropriate/mood-congruent    Mood: euphoric    Presenting Problem: SW met with pt this morning to introduce self/role, perform assessment, and discuss resources. SW has also reviewed pt records. Pt recently delivered a baby boy on 9/12/22. SW consult for postpartum assessment due to score of 13 on the EPDS      Assessment:  SW inquired about any past mental health history, pt shared that anxiety/depression runs in her family. Pt has had experience with postpartum depressions/anxiety. Pt has not been on medications in the past. Pt has insight on the signs and symptoms to look for, SW discussed techniques for coping and the importance of family awareness and support. SW also encouraged pt to alert her MD of any concerns at her follow-up appointment. Pt will be provided with resources in her DC instructions.     Patient states she has a strong family support system as well as support from her [employer].  Patient has no concerns and is prepared to discharge home with baby today.    Plan: Pt feels prepared for discharge and has no additional questions/concerns.Parents are supportive of one another, bonding well with baby, no concerns. No further SW follow-up indicated. Pt and baby to discharge today with above mentioned resources.    Dahiana Graham,  SHAYLA  [facility] [phone]/ [place] [phone]  Care Management

## 2022-09-14 VITALS
SYSTOLIC BLOOD PRESSURE: 122 MMHG | RESPIRATION RATE: 16 BRPM | OXYGEN SATURATION: 94 % | HEART RATE: 74 BPM | TEMPERATURE: 98.1 F | DIASTOLIC BLOOD PRESSURE: 80 MMHG

## 2022-09-14 PROBLEM — O26.899 RH NEGATIVE STATE IN ANTEPARTUM PERIOD: Status: ACTIVE | Noted: 2019-01-24

## 2022-09-14 PROBLEM — Z67.91 RH NEGATIVE STATE IN ANTEPARTUM PERIOD: Status: ACTIVE | Noted: 2019-01-24

## 2022-09-14 PROCEDURE — 250N000013 HC RX MED GY IP 250 OP 250 PS 637: Performed by: FAMILY MEDICINE

## 2022-09-14 PROCEDURE — 99238 HOSP IP/OBS DSCHRG MGMT 30/<: CPT | Performed by: FAMILY MEDICINE

## 2022-09-14 RX ORDER — PRENATAL VIT/IRON FUM/FOLIC AC 27MG-0.8MG
1 TABLET ORAL DAILY
Qty: 90 TABLET | Refills: 3 | COMMUNITY
Start: 2022-09-14

## 2022-09-14 RX ORDER — IBUPROFEN 800 MG/1
800 TABLET, FILM COATED ORAL EVERY 8 HOURS PRN
COMMUNITY
Start: 2022-09-14

## 2022-09-14 RX ADMIN — IBUPROFEN 800 MG: 800 TABLET, FILM COATED ORAL at 04:11

## 2022-09-14 RX ADMIN — PRENATAL VIT W/ FE FUMARATE-FA TAB 27-0.8 MG 1 TABLET: 27-0.8 TAB at 08:49

## 2022-09-14 RX ADMIN — DOCUSATE SODIUM 100 MG: 100 CAPSULE ORAL at 08:49

## 2022-09-14 ASSESSMENT — ACTIVITIES OF DAILY LIVING (ADL)
ADLS_ACUITY_SCORE: 18

## 2022-09-14 NOTE — DISCHARGE SUMMARY
[facility] Discharge Summary    Cindy Tejeda MRN# [id number]   Age: 27 year old YOB: 1995     Date of Admission:  9/11/2022  Date of Discharge::  9/14/2022  Admitting Physician:  Yeimi Malik MD  Discharge Physician:  Margot Ingram Mai, MD     Home clinic: [facility]          Admission Diagnoses:   Encounter for supervision of other normal pregnancy in third trimester [Z34.83]  Group B strep - unknown          Discharge Diagnosis:   Normal spontaneous vaginal delivery  Intrauterine pregnancy at 39w2d weeks gestation  Unknown group B strep status          Procedures:   Procedure(s):  Spontaneous vaginal delivery                Medications Prior to Admission:     Medications Prior to Admission   Medication Sig Dispense Refill Last Dose     acetaminophen (TYLENOL) 325 MG tablet [ACETAMINOPHEN (TYLENOL) 325 MG TABLET] Take 1-2 tablets (325-650 mg total) by mouth every 4 (four) hours as needed.  0 9/10/2022 at 2100     cholecalciferol, vitamin D3, 25 mcg (1,000 unit) capsule [CHOLECALCIFEROL, VITAMIN D3, 25 MCG (1,000 UNIT) CAPSULE] Take 2 capsules (2,000 Units total) by mouth daily. (Patient taking differently: Take by mouth daily)  0 9/10/2022 at 2100     triamcinolone (KENALOG) 0.1 % paste Take by mouth 2 times daily 5 g 1 Past Week at Unknown time     [DISCONTINUED] ibuprofen (ADVIL,MOTRIN) 200 MG tablet [IBUPROFEN (ADVIL,MOTRIN) 200 MG TABLET] Take 4 tablets (800 mg total) by mouth every 8 (eight) hours.        [DISCONTINUED] prenatal no115-iron-folic acid 29 mg iron- 1 mg Chew [PRENATAL -IRON-FOLIC ACID 29 MG IRON- 1 MG CHEW] Chew 1 tablet daily.   9/10/2022 at 2100             Discharge Medications:     Current Discharge Medication List      START taking these medications    Details   Prenatal Vit-Fe Fumarate-FA (PRENATAL MULTIVITAMIN W/IRON) 27-0.8 MG tablet Take 1 tablet by mouth daily  Qty: 90 tablet, Refills: 3    Associated Diagnoses:   (normal spontaneous vaginal delivery)         CONTINUE these medications which have CHANGED    Details   ibuprofen (ADVIL/MOTRIN) 800 MG tablet Take 1 tablet (800 mg) by mouth every 8 hours as needed for other (cramping)    Associated Diagnoses:  (normal spontaneous vaginal delivery)         CONTINUE these medications which have NOT CHANGED    Details   acetaminophen (TYLENOL) 325 MG tablet [ACETAMINOPHEN (TYLENOL) 325 MG TABLET] Take 1-2 tablets (325-650 mg total) by mouth every 4 (four) hours as needed.  Refills: 0    Associated Diagnoses: Normal delivery      cholecalciferol, vitamin D3, 25 mcg (1,000 unit) capsule [CHOLECALCIFEROL, VITAMIN D3, 25 MCG (1,000 UNIT) CAPSULE] Take 2 capsules (2,000 Units total) by mouth daily.  Refills: 0    Associated Diagnoses: Normal delivery      triamcinolone (KENALOG) 0.1 % paste Take by mouth 2 times daily  Qty: 5 g, Refills: 1    Associated Diagnoses: Aphthae, oral         STOP taking these medications       prenatal no115-iron-folic acid 29 mg iron- 1 mg Chew Comments:   Reason for Stopping:                     Consultations:   No consultations were requested during this admission          Brief History of Labor:   Cindy was admitted for active labor.  Routine intrapartum care initiated.  NST was reactive.  GBS unknown and elected not to be treated.  Labor progressed as expected.  Delivered vaginally with no problem or complication.            Hospital Course:     The patient's hospital course was unremarkable.  On discharge, her pain was well controlled with Ibuprofen. Vaginal bleeding is similar to peak menstrual flow.  Voiding without difficulty.  Ambulating well and tolerating a normal diet.  No fever.  No headache or dizziness. No CP/SOB. Breastfeeding well.  Infant is stable.  Normal bowel movement.  No concern from the nursing staff.  She was discharged on post-partum day #2.  At the time of discharge:    Vitals:    22 0745 22 1420 22 2139  09/14/22 0100   BP: 124/66 120/54 113/72 122/80   Pulse: 77 74 67 74   Resp: 16 16     Temp: 96.8  F (36  C) 97.9  F (36.6  C) 99.5  F (37.5  C) 98.1  F (36.7  C)   TempSrc: Oral Oral Oral Oral   SpO2:         General: Alert, pleasant, no acute distress.   Lungs: Clear   Heart: RRR, no murmur.   Abd: Soft, non-distended. No tender with palpation to the lower abdomen.   Ext: Legs with no edema.   Labs reviewed - no concerned findings.    Overall, she is doing well. D/C home today. Post-partum care discussed and educated her about symptoms that need to be seen for or called in clinic. Birth control options also discussed. Post-partum depression symptoms and its prevention were also discussed.  Discussed about postpartum preeclamptic symptoms.  Encourage to keep up with the breastfeeding. Follow up in 6 weeks with Hoda Ny for her post- partum visit, earlier as needed. Encouraged to call the clinic if has any questions or concerns. All of her questions were answered.      Post-partum hemoglobin:   Hemoglobin   Date Value Ref Range Status   09/13/2022 12.3 11.7 - 15.7 g/dL Final             Discharge Instructions and Follow-Up:   Discharge diet: Regular   Discharge activity: Pelvic rest: abstain from intercourse and do not use tampons for 6 week(s)   Discharge follow-up: Follow up with primary care provider in 6 weeks   Wound care: Drink plenty of fluids  Ice to area for comfort  Keep wound clean and dry           Discharge Disposition:   Discharged to home      Attestation:  I have reviewed today's vital signs, notes, medications, labs and imaging.    Margot Ingram Mai, MD

## 2022-09-14 NOTE — PLAN OF CARE
S: Shift review   B:Cindy is a  who delivered vaginally on   A: VSS, patient is independent with mobility, pain well controlled with p.o. pain meds. Handles baby with confidence.  R: Continue with routine PP care Goal Outcome Evaluation:    Plan of Care Reviewed With: patient     Overall Patient Progress: improving

## 2022-09-14 NOTE — PROGRESS NOTES
S: Discharge  to home.    B: Patient had a Vaginal delivery with no complications. Baby boy Baby's name Rocco, breast: . Support person's name Alfonso.     A: VSS. Pain controlled with PO meds. Caring for self and baby independently.    R: Discharge instructions reviewed and questions answered.  Belongings gathered and returned to the patient. Agreed to follow up in 6 weeks or sooner with any question or concerns.     Nursing Discharge Checklist:    Pneumovax screened and given, if appropriate: N/A  Influenza vaccine screened and given, if appropriate: N/A  Staples removed (): N/A  Breast milk returned: N/A  Hydrogel pads sent home:YES  Birth Certificate Done: YES  Public Health Referral Made: N/A

## 2022-09-14 NOTE — PROGRESS NOTES
[facility] Obstetrics Post-Partum Progress Note          Assessment and Plan:    Assessment:   Post-partum day #1  Normal spontaneous vaginal delivery  L&D complications: Unknown GBS status - untreated      Doing well.  No excessive bleeding  Pain well-controlled.      Plan:   Ambulation encouraged  Breast feeding strategies discussed  Pain control measures as needed  Reportable signs and symptoms dicussed with the patient  Anticipate discharge tomorrow  Rh-, RhoGAM to be given if indicated.           Interval History:   Doing well.  Pain is well-controlled.  No fever.  No foul-smelling vaginal discharge.  Good appetite.  No chest pain, shortness of breath, nausea or vomiting.  No headache or dizziness.  Vaginal bleeding is similar to a heavy menstrual flow.  Ambulatory.  Breastfeeding well.  No concern from nursing staff.          Significant Problems:    No past medical history on file.          Review of Systems:    The Review of Systems is negative other than noted in the HPI          Medications:     All medications related to the patient's surgery have been reviewed  Current Facility-Administered Medications   Medication     acetaminophen (TYLENOL) tablet 650 mg     benzocaine-menthol (DERMOPLAST) topical spray     bisacodyl (DULCOLAX) suppository 10 mg     carboprost (HEMABATE) injection 250 mcg     docusate sodium (COLACE) capsule 100 mg     ibuprofen (ADVIL/MOTRIN) tablet 800 mg     lanolin cream     Measles, Mumps & Rubella Vac (MMR) injection 0.5 mL     methylergonovine (METHERGINE) injection 200 mcg     misoprostol (CYTOTEC) tablet 400 mcg    Or     misoprostol (CYTOTEC) suppository 800 mcg     No Tdap Needed - Assessment: Patient does not need Tdap vaccine     oxytocin (PITOCIN) 30 units in 500 mL 0.9% NaCl infusion     oxytocin (PITOCIN) injection 10 Units     prenatal multivitamin w/iron per tablet 1 tablet     tranexamic acid 1 g in 100 mL NS IV bag (premix)             Physical Exam:      All vitals stable  Patient Vitals for the past 24 hrs:   BP Temp Temp src Pulse Resp SpO2   09/13/22 1420 120/54 97.9  F (36.6  C) Oral 74 16 --   09/13/22 0745 124/66 96.8  F (36  C) Oral 77 16 --   09/13/22 0352 -- -- -- -- -- 94 %   09/13/22 0351 123/63 98.3  F (36.8  C) Oral 73 16 --   09/12/22 1938 111/62 98  F (36.7  C) Oral 76 17 97 %     Uterine fundus is firm, non-tender and at the level of the umbilicus  Constitutional:   Alert, pleasant, comfortable in bed, no acute distress.     Lungs:   Clear, no wheezes or crackle     Cardiovascular:   Regular rate and rhythm, no murmur     Abdomen:   Soft, normal bowel sound.  No CVA tenderness.  No tender with palpation to the fundus.  Fundus is firm at umbilical level.     Musculoskeletal:   No leg swelling.             Data:   All laboratory data related to this surgery reviewed  Results for orders placed or performed during the hospital encounter of 09/11/22 (from the past 24 hour(s))   Hemoglobin   Result Value Ref Range    Hemoglobin 12.3 11.7 - 15.7 g/dL   RH Immune Globulin Screen   Result Value Ref Range    ABO/RH(D) A NEG     Fetal Bleed Screen NEG Negative    SPECIMEN EXPIRATION DATE [id number]      No imaging studies have been ordered    Margot Ingram Mai, MD

## 2022-09-14 NOTE — PROGRESS NOTES
Client reports Dahiana from  on unit to see her earlier today and they had a nice conversation. Support references will be on discharge papers.

## 2022-09-14 NOTE — PROGRESS NOTES
S: Status  B: , .  A: Bonding well with baby. Breastfeeding well. VSS. PPD score 13 but patient states she has a great support system and just feels overwhelmed at times having 4 kids so close together.  R: Anticipate discharge in the AM.

## 2022-09-18 NOTE — PROGRESS NOTES
"Cindy Tejeda  Gender: female  : 1995  [address]  [facility] [address]  [phone] (home)   Medical Record: [id number]  Primary Care Provider: Hoda Quach       [facility]   ?   Discharge Phone Call: Key Words/Key Times     How are you and the baby? \"We are both doing great\" Rocco is gaining weight, no jaundice no concerns.     How are feedings going? \"BF every 2-3 hours, no concerns.\"    Voiding & Stooling? Voiding and stooling lots.\"    Any questions or concerns? \"None.\"    Follow-up appointment? 9/15.\"everything good.\"      We want to provide excellent care here at [facility]. Do you have any feedback for us that would help us improve? \"Wonderful care.\"    Call back COMMENTS:         Attempted Calls:   _________     __________  "

## 2023-11-04 ENCOUNTER — OFFICE VISIT (OUTPATIENT)
Dept: URGENT CARE | Facility: URGENT CARE | Age: 28
End: 2023-11-04
Payer: COMMERCIAL

## 2023-11-04 VITALS
OXYGEN SATURATION: 100 % | SYSTOLIC BLOOD PRESSURE: 116 MMHG | BODY MASS INDEX: 25.92 KG/M2 | TEMPERATURE: 99.6 F | DIASTOLIC BLOOD PRESSURE: 80 MMHG | RESPIRATION RATE: 20 BRPM | WEIGHT: 151 LBS | HEART RATE: 105 BPM

## 2023-11-04 DIAGNOSIS — J02.9 SORE THROAT: Primary | ICD-10-CM

## 2023-11-04 PROCEDURE — 87081 CULTURE SCREEN ONLY: CPT | Performed by: NURSE PRACTITIONER

## 2023-11-04 PROCEDURE — 99203 OFFICE O/P NEW LOW 30 MIN: CPT | Performed by: NURSE PRACTITIONER

## 2023-11-04 RX ORDER — AZITHROMYCIN 250 MG/1
TABLET, FILM COATED ORAL
Qty: 6 TABLET | Refills: 0 | Status: SHIPPED | OUTPATIENT
Start: 2023-11-04 | End: 2023-11-09

## 2023-11-04 NOTE — PROGRESS NOTES
SUBJECTIVE:   Cindy Tejeda is a 28 year old female presenting with a chief complaint of sore throat.   Present for sore throat and red rash over various parts of body for 4 days.   Request throat culture only, no rapid test      No past medical history on file.  Current Outpatient Medications   Medication Sig Dispense Refill    acetaminophen (TYLENOL) 325 MG tablet [ACETAMINOPHEN (TYLENOL) 325 MG TABLET] Take 1-2 tablets (325-650 mg total) by mouth every 4 (four) hours as needed.  0    cholecalciferol, vitamin D3, 25 mcg (1,000 unit) capsule [CHOLECALCIFEROL, VITAMIN D3, 25 MCG (1,000 UNIT) CAPSULE] Take 2 capsules (2,000 Units total) by mouth daily. (Patient taking differently: Take by mouth daily)  0    ibuprofen (ADVIL/MOTRIN) 800 MG tablet Take 1 tablet (800 mg) by mouth every 8 hours as needed for other (cramping)      Prenatal Vit-Fe Fumarate-FA (PRENATAL MULTIVITAMIN W/IRON) 27-0.8 MG tablet Take 1 tablet by mouth daily 90 tablet 3    triamcinolone (KENALOG) 0.1 % paste Take by mouth 2 times daily (Patient not taking: Reported on 11/4/2023) 5 g 1     Social History     Tobacco Use    Smoking status: Never    Smokeless tobacco: Never   Substance Use Topics    Alcohol use: Not Currently       ROS:  Review of systems negative except as stated above.    OBJECTIVE:  /80   Pulse 105   Temp 99.6  F (37.6  C) (Tympanic)   Resp 20   Wt 68.5 kg (151 lb)   SpO2 100%   BMI 25.92 kg/m    GENERAL APPEARANCE: Alert and no distress  EYES: EOMI,  PERRL, conjunctiva clear  HENT: ear canals and TM's normal.  Nose normal. Throat erythematous with exudate  NECK: supple, nontender, no lymphadenopathy  RESP: lungs clear to auscultation - no rales, rhonchi or wheezes  CV: regular rates and rhythm, normal S1 S2, no murmur noted  ABDOMEN:  soft, nontender, no HSM or masses and bowel sounds normal  NEURO: Normal strength and tone, sensory exam grossly normal,  normal speech and mentation  SKIN: erythematous sandpaper like  rash on torso arms legs    ASSESSMENT:  (J02.9) Sore throat  (primary encounter diagnosis)    Plan:   Beta strep group A culture   azithromycin (ZITHROMAX) 250 MG tablet treated based on throat and rash  Ibuprofen, Fluids, and Rest  Home treat and monitor sx  Call if new or worsening        CYNDI Quintana CNP

## 2023-11-06 LAB — BACTERIA SPEC CULT: NORMAL

## 2025-02-06 ENCOUNTER — TRANSFERRED RECORDS (OUTPATIENT)
Dept: HEALTH INFORMATION MANAGEMENT | Facility: CLINIC | Age: 30
End: 2025-02-06

## 2025-06-26 ENCOUNTER — TRANSFERRED RECORDS (OUTPATIENT)
Dept: HEALTH INFORMATION MANAGEMENT | Facility: CLINIC | Age: 30
End: 2025-06-26

## 2025-07-15 ENCOUNTER — ANESTHESIA EVENT (OUTPATIENT)
Dept: OBGYN | Facility: HOSPITAL | Age: 30
End: 2025-07-15
Payer: COMMERCIAL

## 2025-07-15 ENCOUNTER — ANESTHESIA (OUTPATIENT)
Dept: OBGYN | Facility: HOSPITAL | Age: 30
End: 2025-07-15
Payer: COMMERCIAL

## 2025-07-15 PROBLEM — Z34.90 PREGNANCY: Status: RESOLVED | Noted: 2025-07-15 | Resolved: 2025-07-15

## 2025-07-15 PROBLEM — Z34.90 PREGNANCY: Status: ACTIVE | Noted: 2025-07-15

## 2025-07-15 PROBLEM — Z34.90 ENCOUNTER FOR INDUCTION OF LABOR: Status: ACTIVE | Noted: 2025-07-15

## 2025-07-15 PROCEDURE — 370N000003 HC ANESTHESIA WARD SERVICE: Performed by: ANESTHESIOLOGY

## 2025-07-15 PROCEDURE — 250N000011 HC RX IP 250 OP 636: Performed by: ANESTHESIOLOGY

## 2025-07-15 RX ORDER — BUPIVACAINE HYDROCHLORIDE 2.5 MG/ML
INJECTION, SOLUTION EPIDURAL; INFILTRATION; INTRACAUDAL; PERINEURAL
Status: DISCONTINUED | OUTPATIENT
Start: 2025-07-15 | End: 2025-07-15

## 2025-07-15 RX ADMIN — BUPIVACAINE HYDROCHLORIDE 8 ML: 2.5 INJECTION, SOLUTION EPIDURAL; INFILTRATION; INTRACAUDAL at 15:35

## 2025-07-15 NOTE — ANESTHESIA PREPROCEDURE EVALUATION
"Anesthesia Pre-Procedure Evaluation    Patient: Cindy Tejeda   MRN: [id number] : 1995          Procedure : * No procedures listed *  Induction      History reviewed. No pertinent past medical history.   Past Surgical History:   Procedure Laterality Date    ENT SURGERY      LAPAROSCOPIC CHOLECYSTECTOMY N/A 2021    Procedure: Laparoscopic Cholecystectomy;  Surgeon: Leandro Hagan MD;  Location: [facility] OR      Allergies   Allergen Reactions    Penicillins     Penicillins Unknown and Other (See Comments)     Father is deathly allergic  Father has significant allergic reaction, pt has not ever been tested or taken any penicillin drugs  Family allergic.         Social History     Tobacco Use    Smoking status: Never    Smokeless tobacco: Never   Substance Use Topics    Alcohol use: Not Currently      Wt Readings from Last 1 Encounters:   07/15/25 83.6 kg (184 lb 3.2 oz)        Anesthesia Evaluation   Pt has had prior anesthetic. Type: OB Labor Epidural.        ROS/MED HX  ENT/Pulmonary:       Neurologic:       Cardiovascular:       METS/Exercise Tolerance:     Hematologic:       Musculoskeletal:       GI/Hepatic:       Renal/Genitourinary:       Endo:       Psychiatric/Substance Use:       Infectious Disease:       Malignancy:       Other:              Physical Exam  Airway  Mallampati: II  TM distance: > 3 FB  Neck ROM: full    Cardiovascular    Dental     Pulmonary       Neurological   Other Findings       OUTSIDE LABS:  CBC:   Lab Results   Component Value Date    WBC 7.3 07/15/2025    WBC 7.9 2022    HGB 12.3 07/15/2025    HGB 12.3 2022    HCT 35.4 07/15/2025    HCT 35.7 2022     07/15/2025     2022     BMP:   Lab Results   Component Value Date     2021    POTASSIUM 3.5 2021    CHLORIDE 107 2021    CO2 20 (L) 2021    BUN 8 2021    CR 0.73 2021     2021     COAGS: No results found for: \"PTT\", \"INR\", \"FIBR\"  POC: No " "results found for: \"BGM\", \"HCG\", \"HCGS\"  HEPATIC: No results found for: \"ALBUMIN\", \"PROTTOTAL\", \"ALT\", \"AST\", \"GGT\", \"ALKPHOS\", \"BILITOTAL\", \"BILIDIRECT\", \"MAGDALENO\"  OTHER:   Lab Results   Component Value Date    A1C 5.7 (H) 07/15/2025    JUICE 8.6 2021       Anesthesia Plan    ASA Status:  2               Consents            Postoperative Care         Comments:    Other Comments:  in labor               Liane Mast MD    I have reviewed the pertinent notes and labs in the chart from the past 30 days and (re)examined the patient.  Any updates or changes from those notes are reflected in this note.    Clinically Significant Risk Factors Present on Admission                                              "

## 2025-07-15 NOTE — ANESTHESIA PROCEDURE NOTES
"Epidural catheter Procedure Note    Pre-Procedure   Staff -        Anesthesiologist:  Liane Mast MD       Performed By: anesthesiologist       Location: OB       Procedure Start/Stop Times: 7/15/2025 3:35 PM and 7/15/2025 3:51 PM       Pre-Anesthestic Checklist: patient identified, IV checked, risks and benefits discussed, informed consent, monitors and equipment checked, pre-op evaluation, at physician/surgeon's request and post-op pain management  Timeout:       Correct Patient: Yes        Correct Procedure: Yes        Correct Site: Yes        Correct Position: Yes   Procedure Documentation  Procedure: epidural catheter         Patient Position: sitting       Skin prep: Chloraprep       Local skin infiltrated with mL of 1% lidocaine.        Insertion Site: L3-4. (midline approach).       Technique: LORT saline and LORT air        Needle Type: Xenapto needle       Needle Gauge: 18.        Needle Length (Inches): 3.5        Catheter: 20 G.          Catheter threaded easily.           Threaded 11 cm at skin.         # of attempts: 1 and  # of redirects:     Assessment/Narrative         Test dose of 5 mL lidocaine 1.5% w/ 1:200,000 epinephrine at.        .       Insertion/Infusion Method: LORT saline and LORT air    Medication(s) Administered   0.25% Bupivacaine PF (Epidural) - EPIDURAL   8 mL - 7/15/2025 3:35:00 PM  Medication Administration Time: 7/15/2025 3:35 PM      FOR [facility] ([facility]) ONLY:   Pain Team Contact information: please page the Pain Team Via [employer]. Search \"Pain\". During daytime hours, please page the attending first. At night please page the resident first.      "

## (undated) DEVICE — STOCKING SLEEVE COMPRESSION CALF LG

## (undated) DEVICE — DRAPE POUCH INSTRUMENT 3 POCKET 1018L

## (undated) DEVICE — ESU HOLSTER PLASTIC DISP E2400

## (undated) DEVICE — SOL NACL 0.9% IRRIG 3000ML BAG 07972-08

## (undated) DEVICE — ESU ENDO SCISSORS 5MM CVD 5DCS

## (undated) DEVICE — SUCTION IRRIGATION STRYKFLOW II W/TIP DISP 250-070-520

## (undated) DEVICE — ENDO POUCH UNIV RETRIEVAL SYSTEM INZII 10MM CD001

## (undated) DEVICE — SOL NACL 0.9% IRRIG 1000ML BOTTLE 07138-09

## (undated) DEVICE — CLIP APPLIER ENDO 5MM M/L LIGAMAX EL5ML

## (undated) DEVICE — GLOVE PROTEXIS BLUE W/NEU-THERA 6.5  2D73EB65

## (undated) DEVICE — GLOVE PROTEXIS W/NEU-THERA 7.5  2D73TE75

## (undated) DEVICE — SOL WATER IRRIG 1000ML BOTTLE 07139-09

## (undated) DEVICE — ENDO TROCAR FIRST ENTRY KII FIOS ADV FIX 05X100MM CFF03

## (undated) DEVICE — GLOVE PROTEXIS W/NEU-THERA 8.0  2D73TE80

## (undated) DEVICE — Device

## (undated) DEVICE — SU VICRYL 4-0 FS-2 27" J422-H

## (undated) DEVICE — ENDO TROCAR FIRST ENTRY KII FIOS ADV FIX 11X100MM CFF33

## (undated) DEVICE — SU VICRYL 0 UR-6 27" J603H

## (undated) DEVICE — PREP CHLORAPREP 26ML TINTED ORANGE  260815

## (undated) DEVICE — ENDO TROCAR BLUNT TIP KII BALLOON 12X100MM C0R47

## (undated) DEVICE — GOWN LG DISP 9515

## (undated) DEVICE — ENDO TROCAR SLEEVE KII ADV FIXATION 05X100MM CFS02

## (undated) DEVICE — GLOVE PROTEXIS W/NEU-THERA 6.5  2D73TE65

## (undated) DEVICE — ESU CORD MONOPOLAR 10'  E0510

## (undated) DEVICE — GLOVE PROTEXIS BLUE W/NEU-THERA 8.0  2D73EB80

## (undated) DEVICE — GOWN XLG DISP 9545

## (undated) DEVICE — ADH SKIN CLOSURE PREMIERPRO EXOFIN 1.0ML 3470

## (undated) RX ORDER — DEXAMETHASONE SODIUM PHOSPHATE 4 MG/ML
INJECTION, SOLUTION INTRA-ARTICULAR; INTRALESIONAL; INTRAMUSCULAR; INTRAVENOUS; SOFT TISSUE
Status: DISPENSED
Start: 2021-11-02

## (undated) RX ORDER — FENTANYL CITRATE 50 UG/ML
INJECTION, SOLUTION INTRAMUSCULAR; INTRAVENOUS
Status: DISPENSED
Start: 2021-11-02

## (undated) RX ORDER — KETOROLAC TROMETHAMINE 30 MG/ML
INJECTION, SOLUTION INTRAMUSCULAR; INTRAVENOUS
Status: DISPENSED
Start: 2021-11-02

## (undated) RX ORDER — GLYCOPYRROLATE 0.2 MG/ML
INJECTION, SOLUTION INTRAMUSCULAR; INTRAVENOUS
Status: DISPENSED
Start: 2021-11-02

## (undated) RX ORDER — BUPIVACAINE HYDROCHLORIDE AND EPINEPHRINE 2.5; 5 MG/ML; UG/ML
INJECTION, SOLUTION EPIDURAL; INFILTRATION; INTRACAUDAL; PERINEURAL
Status: DISPENSED
Start: 2021-11-02

## (undated) RX ORDER — MEPERIDINE HYDROCHLORIDE 25 MG/ML
INJECTION INTRAMUSCULAR; INTRAVENOUS; SUBCUTANEOUS
Status: DISPENSED
Start: 2021-11-02

## (undated) RX ORDER — GABAPENTIN 300 MG/1
CAPSULE ORAL
Status: DISPENSED
Start: 2021-11-02

## (undated) RX ORDER — CEFAZOLIN SODIUM 2 G/100ML
INJECTION, SOLUTION INTRAVENOUS
Status: DISPENSED
Start: 2021-11-02

## (undated) RX ORDER — ONDANSETRON 2 MG/ML
INJECTION INTRAMUSCULAR; INTRAVENOUS
Status: DISPENSED
Start: 2021-11-02

## (undated) RX ORDER — HYDROCODONE BITARTRATE AND ACETAMINOPHEN 5; 325 MG/1; MG/1
TABLET ORAL
Status: DISPENSED
Start: 2021-11-02

## (undated) RX ORDER — LIDOCAINE HYDROCHLORIDE 10 MG/ML
INJECTION, SOLUTION EPIDURAL; INFILTRATION; INTRACAUDAL; PERINEURAL
Status: DISPENSED
Start: 2021-11-02

## (undated) RX ORDER — ACETAMINOPHEN 325 MG/1
TABLET ORAL
Status: DISPENSED
Start: 2021-11-02

## (undated) RX ORDER — PROPOFOL 10 MG/ML
INJECTION, EMULSION INTRAVENOUS
Status: DISPENSED
Start: 2021-11-02